# Patient Record
Sex: MALE | Race: WHITE | Employment: FULL TIME | URBAN - METROPOLITAN AREA
[De-identification: names, ages, dates, MRNs, and addresses within clinical notes are randomized per-mention and may not be internally consistent; named-entity substitution may affect disease eponyms.]

---

## 2021-02-28 PROCEDURE — 96375 TX/PRO/DX INJ NEW DRUG ADDON: CPT

## 2021-02-28 PROCEDURE — 99285 EMERGENCY DEPT VISIT HI MDM: CPT

## 2021-02-28 PROCEDURE — 96374 THER/PROPH/DIAG INJ IV PUSH: CPT

## 2021-03-01 ENCOUNTER — ANESTHESIA (OUTPATIENT)
Dept: OPERATING ROOM | Age: 61
DRG: 419 | End: 2021-03-01
Payer: COMMERCIAL

## 2021-03-01 ENCOUNTER — HOSPITAL ENCOUNTER (INPATIENT)
Age: 61
LOS: 1 days | Discharge: HOME | DRG: 419 | End: 2021-03-01
Attending: EMERGENCY MEDICINE | Admitting: PHYSICIAN ASSISTANT
Payer: COMMERCIAL

## 2021-03-01 ENCOUNTER — APPOINTMENT (OUTPATIENT)
Dept: CT IMAGING | Age: 61
DRG: 419 | End: 2021-03-01
Payer: COMMERCIAL

## 2021-03-01 ENCOUNTER — ANESTHESIA EVENT (OUTPATIENT)
Dept: OPERATING ROOM | Age: 61
DRG: 419 | End: 2021-03-01
Payer: COMMERCIAL

## 2021-03-01 VITALS
HEART RATE: 68 BPM | RESPIRATION RATE: 16 BRPM | HEIGHT: 68 IN | BODY MASS INDEX: 25.14 KG/M2 | OXYGEN SATURATION: 95 % | WEIGHT: 165.9 LBS | SYSTOLIC BLOOD PRESSURE: 118 MMHG | DIASTOLIC BLOOD PRESSURE: 72 MMHG | TEMPERATURE: 98.2 F

## 2021-03-01 VITALS — SYSTOLIC BLOOD PRESSURE: 104 MMHG | OXYGEN SATURATION: 100 % | DIASTOLIC BLOOD PRESSURE: 58 MMHG

## 2021-03-01 DIAGNOSIS — R52 INTRACTABLE PAIN: ICD-10-CM

## 2021-03-01 DIAGNOSIS — K80.20 SYMPTOMATIC CHOLELITHIASIS: Primary | ICD-10-CM

## 2021-03-01 PROBLEM — K81.0 ACUTE CHOLECYSTITIS: Status: ACTIVE | Noted: 2021-03-01

## 2021-03-01 LAB
ALBUMIN SERPL-MCNC: 4.5 G/DL (ref 3.4–5)
ALP BLD-CCNC: 87 U/L (ref 40–129)
ALT SERPL-CCNC: 16 U/L (ref 10–40)
ANION GAP SERPL CALCULATED.3IONS-SCNC: 11 MMOL/L (ref 3–16)
AST SERPL-CCNC: 20 U/L (ref 15–37)
BASOPHILS ABSOLUTE: 0.1 K/UL (ref 0–0.2)
BASOPHILS RELATIVE PERCENT: 0.9 %
BILIRUB SERPL-MCNC: <0.2 MG/DL (ref 0–1)
BILIRUBIN DIRECT: <0.2 MG/DL (ref 0–0.3)
BILIRUBIN URINE: NEGATIVE
BILIRUBIN, INDIRECT: NORMAL MG/DL (ref 0–1)
BLOOD, URINE: ABNORMAL
BUN BLDV-MCNC: 12 MG/DL (ref 7–20)
CALCIUM SERPL-MCNC: 9.6 MG/DL (ref 8.3–10.6)
CHLORIDE BLD-SCNC: 106 MMOL/L (ref 99–110)
CLARITY: CLEAR
CO2: 23 MMOL/L (ref 21–32)
COLOR: YELLOW
CREAT SERPL-MCNC: 0.7 MG/DL (ref 0.8–1.3)
EOSINOPHILS ABSOLUTE: 0.1 K/UL (ref 0–0.6)
EOSINOPHILS RELATIVE PERCENT: 1 %
EPITHELIAL CELLS, UA: ABNORMAL /HPF (ref 0–5)
GFR AFRICAN AMERICAN: >60
GFR NON-AFRICAN AMERICAN: >60
GLUCOSE BLD-MCNC: 124 MG/DL (ref 70–99)
GLUCOSE URINE: NEGATIVE MG/DL
HCT VFR BLD CALC: 43.8 % (ref 40.5–52.5)
HEMOGLOBIN: 14.6 G/DL (ref 13.5–17.5)
KETONES, URINE: NEGATIVE MG/DL
LACTIC ACID, SEPSIS: 1.2 MMOL/L (ref 0.4–1.9)
LEUKOCYTE ESTERASE, URINE: NEGATIVE
LIPASE: 30 U/L (ref 13–60)
LYMPHOCYTES ABSOLUTE: 1.4 K/UL (ref 1–5.1)
LYMPHOCYTES RELATIVE PERCENT: 10.4 %
MCH RBC QN AUTO: 30.7 PG (ref 26–34)
MCHC RBC AUTO-ENTMCNC: 33.4 G/DL (ref 31–36)
MCV RBC AUTO: 91.8 FL (ref 80–100)
MICROSCOPIC EXAMINATION: YES
MONOCYTES ABSOLUTE: 0.6 K/UL (ref 0–1.3)
MONOCYTES RELATIVE PERCENT: 4.2 %
MUCUS: ABNORMAL /LPF
NEUTROPHILS ABSOLUTE: 11.6 K/UL (ref 1.7–7.7)
NEUTROPHILS RELATIVE PERCENT: 83.5 %
NITRITE, URINE: NEGATIVE
PDW BLD-RTO: 14 % (ref 12.4–15.4)
PH UA: 6 (ref 5–8)
PLATELET # BLD: 244 K/UL (ref 135–450)
PMV BLD AUTO: 7.6 FL (ref 5–10.5)
POTASSIUM REFLEX MAGNESIUM: 4.3 MMOL/L (ref 3.5–5.1)
PROCALCITONIN: 0.03 NG/ML (ref 0–0.15)
PROTEIN UA: NEGATIVE MG/DL
RBC # BLD: 4.77 M/UL (ref 4.2–5.9)
RBC UA: ABNORMAL /HPF (ref 0–4)
RENAL EPITHELIAL, UA: ABNORMAL /HPF (ref 0–1)
SARS-COV-2, NAAT: NOT DETECTED
SODIUM BLD-SCNC: 140 MMOL/L (ref 136–145)
SPECIFIC GRAVITY UA: >=1.03 (ref 1–1.03)
TOTAL PROTEIN: 7.4 G/DL (ref 6.4–8.2)
URINE TYPE: ABNORMAL
UROBILINOGEN, URINE: 0.2 E.U./DL
WBC # BLD: 13.9 K/UL (ref 4–11)
WBC UA: ABNORMAL /HPF (ref 0–5)

## 2021-03-01 PROCEDURE — 84145 PROCALCITONIN (PCT): CPT

## 2021-03-01 PROCEDURE — 99221 1ST HOSP IP/OBS SF/LOW 40: CPT | Performed by: SURGERY

## 2021-03-01 PROCEDURE — 2580000003 HC RX 258: Performed by: NURSE ANESTHETIST, CERTIFIED REGISTERED

## 2021-03-01 PROCEDURE — 1200000000 HC SEMI PRIVATE

## 2021-03-01 PROCEDURE — 0FT44ZZ RESECTION OF GALLBLADDER, PERCUTANEOUS ENDOSCOPIC APPROACH: ICD-10-PCS | Performed by: SURGERY

## 2021-03-01 PROCEDURE — 2720000010 HC SURG SUPPLY STERILE: Performed by: SURGERY

## 2021-03-01 PROCEDURE — 85025 COMPLETE CBC W/AUTO DIFF WBC: CPT

## 2021-03-01 PROCEDURE — 6360000002 HC RX W HCPCS: Performed by: EMERGENCY MEDICINE

## 2021-03-01 PROCEDURE — 80076 HEPATIC FUNCTION PANEL: CPT

## 2021-03-01 PROCEDURE — 36415 COLL VENOUS BLD VENIPUNCTURE: CPT

## 2021-03-01 PROCEDURE — 80048 BASIC METABOLIC PNL TOTAL CA: CPT

## 2021-03-01 PROCEDURE — 6360000002 HC RX W HCPCS: Performed by: HOSPITALIST

## 2021-03-01 PROCEDURE — 83690 ASSAY OF LIPASE: CPT

## 2021-03-01 PROCEDURE — 2580000003 HC RX 258: Performed by: EMERGENCY MEDICINE

## 2021-03-01 PROCEDURE — 88304 TISSUE EXAM BY PATHOLOGIST: CPT

## 2021-03-01 PROCEDURE — 3600000014 HC SURGERY LEVEL 4 ADDTL 15MIN: Performed by: SURGERY

## 2021-03-01 PROCEDURE — 2580000003 HC RX 258: Performed by: SURGERY

## 2021-03-01 PROCEDURE — 83605 ASSAY OF LACTIC ACID: CPT

## 2021-03-01 PROCEDURE — 6360000004 HC RX CONTRAST MEDICATION: Performed by: EMERGENCY MEDICINE

## 2021-03-01 PROCEDURE — 3700000001 HC ADD 15 MINUTES (ANESTHESIA): Performed by: SURGERY

## 2021-03-01 PROCEDURE — 3600000004 HC SURGERY LEVEL 4 BASE: Performed by: SURGERY

## 2021-03-01 PROCEDURE — 6360000002 HC RX W HCPCS: Performed by: NURSE ANESTHETIST, CERTIFIED REGISTERED

## 2021-03-01 PROCEDURE — 7100000000 HC PACU RECOVERY - FIRST 15 MIN: Performed by: SURGERY

## 2021-03-01 PROCEDURE — 74177 CT ABD & PELVIS W/CONTRAST: CPT

## 2021-03-01 PROCEDURE — 2500000003 HC RX 250 WO HCPCS: Performed by: HOSPITALIST

## 2021-03-01 PROCEDURE — 99222 1ST HOSP IP/OBS MODERATE 55: CPT | Performed by: PHYSICIAN ASSISTANT

## 2021-03-01 PROCEDURE — 2709999900 HC NON-CHARGEABLE SUPPLY: Performed by: SURGERY

## 2021-03-01 PROCEDURE — 2500000003 HC RX 250 WO HCPCS: Performed by: SURGERY

## 2021-03-01 PROCEDURE — 87635 SARS-COV-2 COVID-19 AMP PRB: CPT

## 2021-03-01 PROCEDURE — 47562 LAPAROSCOPIC CHOLECYSTECTOMY: CPT | Performed by: SURGERY

## 2021-03-01 PROCEDURE — 3700000000 HC ANESTHESIA ATTENDED CARE: Performed by: SURGERY

## 2021-03-01 PROCEDURE — 2580000003 HC RX 258: Performed by: HOSPITALIST

## 2021-03-01 PROCEDURE — 2500000003 HC RX 250 WO HCPCS: Performed by: NURSE ANESTHETIST, CERTIFIED REGISTERED

## 2021-03-01 PROCEDURE — 81001 URINALYSIS AUTO W/SCOPE: CPT

## 2021-03-01 PROCEDURE — 7100000001 HC PACU RECOVERY - ADDTL 15 MIN: Performed by: SURGERY

## 2021-03-01 RX ORDER — OXYCODONE HYDROCHLORIDE AND ACETAMINOPHEN 5; 325 MG/1; MG/1
2 TABLET ORAL PRN
Status: DISCONTINUED | OUTPATIENT
Start: 2021-03-01 | End: 2021-03-01

## 2021-03-01 RX ORDER — ACETAMINOPHEN 650 MG/1
650 SUPPOSITORY RECTAL EVERY 6 HOURS PRN
Status: DISCONTINUED | OUTPATIENT
Start: 2021-03-01 | End: 2021-03-01 | Stop reason: HOSPADM

## 2021-03-01 RX ORDER — SODIUM CHLORIDE 0.9 % (FLUSH) 0.9 %
10 SYRINGE (ML) INJECTION EVERY 12 HOURS SCHEDULED
Status: DISCONTINUED | OUTPATIENT
Start: 2021-03-01 | End: 2021-03-01 | Stop reason: HOSPADM

## 2021-03-01 RX ORDER — SODIUM CHLORIDE 9 MG/ML
INJECTION, SOLUTION INTRAVENOUS CONTINUOUS
Status: DISCONTINUED | OUTPATIENT
Start: 2021-03-01 | End: 2021-03-01 | Stop reason: HOSPADM

## 2021-03-01 RX ORDER — KETOROLAC TROMETHAMINE 30 MG/ML
15 INJECTION, SOLUTION INTRAMUSCULAR; INTRAVENOUS ONCE
Status: COMPLETED | OUTPATIENT
Start: 2021-03-01 | End: 2021-03-01

## 2021-03-01 RX ORDER — CIPROFLOXACIN 2 MG/ML
400 INJECTION, SOLUTION INTRAVENOUS EVERY 12 HOURS
Status: DISCONTINUED | OUTPATIENT
Start: 2021-03-01 | End: 2021-03-01 | Stop reason: HOSPADM

## 2021-03-01 RX ORDER — HYDRALAZINE HYDROCHLORIDE 20 MG/ML
5 INJECTION INTRAMUSCULAR; INTRAVENOUS EVERY 10 MIN PRN
Status: DISCONTINUED | OUTPATIENT
Start: 2021-03-01 | End: 2021-03-01

## 2021-03-01 RX ORDER — OXYCODONE HYDROCHLORIDE 5 MG/1
5 TABLET ORAL EVERY 4 HOURS PRN
Status: DISCONTINUED | OUTPATIENT
Start: 2021-03-01 | End: 2021-03-01 | Stop reason: HOSPADM

## 2021-03-01 RX ORDER — SODIUM CHLORIDE, SODIUM LACTATE, POTASSIUM CHLORIDE, CALCIUM CHLORIDE 600; 310; 30; 20 MG/100ML; MG/100ML; MG/100ML; MG/100ML
INJECTION, SOLUTION INTRAVENOUS CONTINUOUS PRN
Status: DISCONTINUED | OUTPATIENT
Start: 2021-03-01 | End: 2021-03-01 | Stop reason: SDUPTHER

## 2021-03-01 RX ORDER — OXYCODONE HYDROCHLORIDE 5 MG/1
10 TABLET ORAL EVERY 4 HOURS PRN
Status: DISCONTINUED | OUTPATIENT
Start: 2021-03-01 | End: 2021-03-01 | Stop reason: HOSPADM

## 2021-03-01 RX ORDER — ONDANSETRON 2 MG/ML
INJECTION INTRAMUSCULAR; INTRAVENOUS PRN
Status: DISCONTINUED | OUTPATIENT
Start: 2021-03-01 | End: 2021-03-01 | Stop reason: SDUPTHER

## 2021-03-01 RX ORDER — LIDOCAINE HYDROCHLORIDE 20 MG/ML
INJECTION, SOLUTION INFILTRATION; PERINEURAL PRN
Status: DISCONTINUED | OUTPATIENT
Start: 2021-03-01 | End: 2021-03-01 | Stop reason: SDUPTHER

## 2021-03-01 RX ORDER — MEPERIDINE HYDROCHLORIDE 25 MG/ML
12.5 INJECTION INTRAMUSCULAR; INTRAVENOUS; SUBCUTANEOUS EVERY 5 MIN PRN
Status: DISCONTINUED | OUTPATIENT
Start: 2021-03-01 | End: 2021-03-01

## 2021-03-01 RX ORDER — ONDANSETRON 2 MG/ML
4 INJECTION INTRAMUSCULAR; INTRAVENOUS EVERY 6 HOURS PRN
Status: DISCONTINUED | OUTPATIENT
Start: 2021-03-01 | End: 2021-03-01 | Stop reason: HOSPADM

## 2021-03-01 RX ORDER — PROMETHAZINE HYDROCHLORIDE 25 MG/1
12.5 TABLET ORAL EVERY 6 HOURS PRN
Status: DISCONTINUED | OUTPATIENT
Start: 2021-03-01 | End: 2021-03-01 | Stop reason: HOSPADM

## 2021-03-01 RX ORDER — LORATADINE 10 MG/1
10 TABLET ORAL DAILY
COMMUNITY

## 2021-03-01 RX ORDER — OXYCODONE HYDROCHLORIDE 5 MG/1
5 TABLET ORAL EVERY 4 HOURS PRN
Qty: 20 TABLET | Refills: 0 | Status: SHIPPED | OUTPATIENT
Start: 2021-03-01 | End: 2021-03-06

## 2021-03-01 RX ORDER — ROCURONIUM BROMIDE 10 MG/ML
INJECTION, SOLUTION INTRAVENOUS PRN
Status: DISCONTINUED | OUTPATIENT
Start: 2021-03-01 | End: 2021-03-01 | Stop reason: SDUPTHER

## 2021-03-01 RX ORDER — OXYCODONE HYDROCHLORIDE AND ACETAMINOPHEN 5; 325 MG/1; MG/1
1 TABLET ORAL PRN
Status: DISCONTINUED | OUTPATIENT
Start: 2021-03-01 | End: 2021-03-01

## 2021-03-01 RX ORDER — BUPIVACAINE HYDROCHLORIDE 5 MG/ML
INJECTION, SOLUTION EPIDURAL; INTRACAUDAL PRN
Status: DISCONTINUED | OUTPATIENT
Start: 2021-03-01 | End: 2021-03-01 | Stop reason: ALTCHOICE

## 2021-03-01 RX ORDER — DEXAMETHASONE SODIUM PHOSPHATE 4 MG/ML
INJECTION, SOLUTION INTRA-ARTICULAR; INTRALESIONAL; INTRAMUSCULAR; INTRAVENOUS; SOFT TISSUE PRN
Status: DISCONTINUED | OUTPATIENT
Start: 2021-03-01 | End: 2021-03-01 | Stop reason: SDUPTHER

## 2021-03-01 RX ORDER — FENTANYL CITRATE 50 UG/ML
INJECTION, SOLUTION INTRAMUSCULAR; INTRAVENOUS PRN
Status: DISCONTINUED | OUTPATIENT
Start: 2021-03-01 | End: 2021-03-01 | Stop reason: SDUPTHER

## 2021-03-01 RX ORDER — SODIUM CHLORIDE, SODIUM LACTATE, POTASSIUM CHLORIDE, AND CALCIUM CHLORIDE .6; .31; .03; .02 G/100ML; G/100ML; G/100ML; G/100ML
IRRIGANT IRRIGATION PRN
Status: DISCONTINUED | OUTPATIENT
Start: 2021-03-01 | End: 2021-03-01 | Stop reason: ALTCHOICE

## 2021-03-01 RX ORDER — ACETAMINOPHEN 325 MG/1
650 TABLET ORAL EVERY 6 HOURS PRN
Status: DISCONTINUED | OUTPATIENT
Start: 2021-03-01 | End: 2021-03-01 | Stop reason: HOSPADM

## 2021-03-01 RX ORDER — LABETALOL HYDROCHLORIDE 5 MG/ML
5 INJECTION, SOLUTION INTRAVENOUS EVERY 10 MIN PRN
Status: DISCONTINUED | OUTPATIENT
Start: 2021-03-01 | End: 2021-03-01

## 2021-03-01 RX ORDER — 0.9 % SODIUM CHLORIDE 0.9 %
1000 INTRAVENOUS SOLUTION INTRAVENOUS ONCE
Status: COMPLETED | OUTPATIENT
Start: 2021-03-01 | End: 2021-03-01

## 2021-03-01 RX ORDER — KETOROLAC TROMETHAMINE 30 MG/ML
INJECTION, SOLUTION INTRAMUSCULAR; INTRAVENOUS PRN
Status: DISCONTINUED | OUTPATIENT
Start: 2021-03-01 | End: 2021-03-01 | Stop reason: SDUPTHER

## 2021-03-01 RX ORDER — ONDANSETRON 2 MG/ML
4 INJECTION INTRAMUSCULAR; INTRAVENOUS EVERY 10 MIN PRN
Status: DISCONTINUED | OUTPATIENT
Start: 2021-03-01 | End: 2021-03-01

## 2021-03-01 RX ORDER — SODIUM CHLORIDE 0.9 % (FLUSH) 0.9 %
10 SYRINGE (ML) INJECTION PRN
Status: DISCONTINUED | OUTPATIENT
Start: 2021-03-01 | End: 2021-03-01 | Stop reason: HOSPADM

## 2021-03-01 RX ORDER — MORPHINE SULFATE 2 MG/ML
2 INJECTION, SOLUTION INTRAMUSCULAR; INTRAVENOUS EVERY 4 HOURS PRN
Status: DISCONTINUED | OUTPATIENT
Start: 2021-03-01 | End: 2021-03-01 | Stop reason: HOSPADM

## 2021-03-01 RX ORDER — POLYETHYLENE GLYCOL 3350 17 G/17G
17 POWDER, FOR SOLUTION ORAL DAILY PRN
Status: DISCONTINUED | OUTPATIENT
Start: 2021-03-01 | End: 2021-03-01 | Stop reason: HOSPADM

## 2021-03-01 RX ORDER — PROPOFOL 10 MG/ML
INJECTION, EMULSION INTRAVENOUS PRN
Status: DISCONTINUED | OUTPATIENT
Start: 2021-03-01 | End: 2021-03-01 | Stop reason: SDUPTHER

## 2021-03-01 RX ADMIN — METRONIDAZOLE 500 MG: 500 INJECTION, SOLUTION INTRAVENOUS at 14:20

## 2021-03-01 RX ADMIN — SODIUM CHLORIDE 1000 ML: 9 INJECTION, SOLUTION INTRAVENOUS at 01:49

## 2021-03-01 RX ADMIN — SUGAMMADEX 200 MG: 100 INJECTION, SOLUTION INTRAVENOUS at 11:52

## 2021-03-01 RX ADMIN — KETOROLAC TROMETHAMINE 15 MG: 30 INJECTION, SOLUTION INTRAMUSCULAR; INTRAVENOUS at 03:00

## 2021-03-01 RX ADMIN — FENTANYL CITRATE 50 MCG: 50 INJECTION INTRAMUSCULAR; INTRAVENOUS at 11:26

## 2021-03-01 RX ADMIN — METRONIDAZOLE 500 MG: 500 INJECTION, SOLUTION INTRAVENOUS at 05:41

## 2021-03-01 RX ADMIN — ROCURONIUM BROMIDE 50 MG: 10 INJECTION, SOLUTION INTRAVENOUS at 11:10

## 2021-03-01 RX ADMIN — IOPAMIDOL 75 ML: 755 INJECTION, SOLUTION INTRAVENOUS at 01:59

## 2021-03-01 RX ADMIN — KETOROLAC TROMETHAMINE 30 MG: 30 INJECTION, SOLUTION INTRAMUSCULAR at 11:52

## 2021-03-01 RX ADMIN — SODIUM CHLORIDE, POTASSIUM CHLORIDE, SODIUM LACTATE AND CALCIUM CHLORIDE: 600; 310; 30; 20 INJECTION, SOLUTION INTRAVENOUS at 11:06

## 2021-03-01 RX ADMIN — ENOXAPARIN SODIUM 40 MG: 40 INJECTION SUBCUTANEOUS at 09:39

## 2021-03-01 RX ADMIN — SODIUM CHLORIDE, POTASSIUM CHLORIDE, SODIUM LACTATE AND CALCIUM CHLORIDE: 600; 310; 30; 20 INJECTION, SOLUTION INTRAVENOUS at 11:46

## 2021-03-01 RX ADMIN — LIDOCAINE HYDROCHLORIDE 30 MG: 20 INJECTION, SOLUTION INFILTRATION; PERINEURAL at 11:10

## 2021-03-01 RX ADMIN — PROPOFOL 150 MG: 10 INJECTION, EMULSION INTRAVENOUS at 11:10

## 2021-03-01 RX ADMIN — ONDANSETRON 4 MG: 2 INJECTION, SOLUTION INTRAMUSCULAR; INTRAVENOUS at 11:17

## 2021-03-01 RX ADMIN — Medication 10 ML: at 05:41

## 2021-03-01 RX ADMIN — CIPROFLOXACIN 400 MG: 2 INJECTION, SOLUTION INTRAVENOUS at 05:41

## 2021-03-01 RX ADMIN — SODIUM CHLORIDE: 9 INJECTION, SOLUTION INTRAVENOUS at 05:40

## 2021-03-01 RX ADMIN — FENTANYL CITRATE 50 MCG: 50 INJECTION INTRAMUSCULAR; INTRAVENOUS at 11:10

## 2021-03-01 RX ADMIN — DEXAMETHASONE SODIUM PHOSPHATE 8 MG: 4 INJECTION, SOLUTION INTRAMUSCULAR; INTRAVENOUS at 11:17

## 2021-03-01 RX ADMIN — HYDROMORPHONE HYDROCHLORIDE 0.5 MG: 1 INJECTION, SOLUTION INTRAMUSCULAR; INTRAVENOUS; SUBCUTANEOUS at 01:49

## 2021-03-01 ASSESSMENT — PULMONARY FUNCTION TESTS
PIF_VALUE: 10
PIF_VALUE: 13
PIF_VALUE: 12
PIF_VALUE: 11
PIF_VALUE: 11
PIF_VALUE: 14
PIF_VALUE: 0
PIF_VALUE: 4
PIF_VALUE: 11
PIF_VALUE: 15
PIF_VALUE: 3
PIF_VALUE: 13
PIF_VALUE: 16
PIF_VALUE: 15
PIF_VALUE: 1
PIF_VALUE: 12
PIF_VALUE: 19
PIF_VALUE: 15
PIF_VALUE: 15
PIF_VALUE: 1
PIF_VALUE: 15
PIF_VALUE: 10
PIF_VALUE: 19
PIF_VALUE: 18
PIF_VALUE: 17
PIF_VALUE: 10
PIF_VALUE: 18
PIF_VALUE: 17
PIF_VALUE: 14
PIF_VALUE: 0
PIF_VALUE: 12
PIF_VALUE: 0
PIF_VALUE: 20
PIF_VALUE: 8
PIF_VALUE: 7
PIF_VALUE: 5
PIF_VALUE: 18
PIF_VALUE: 12

## 2021-03-01 ASSESSMENT — PAIN SCALES - GENERAL
PAINLEVEL_OUTOF10: 0
PAINLEVEL_OUTOF10: 1

## 2021-03-01 ASSESSMENT — PAIN DESCRIPTION - DESCRIPTORS: DESCRIPTORS: DULL;DISCOMFORT

## 2021-03-01 ASSESSMENT — PAIN DESCRIPTION - PROGRESSION: CLINICAL_PROGRESSION: NOT CHANGED

## 2021-03-01 ASSESSMENT — COPD QUESTIONNAIRES: CAT_SEVERITY: MILD

## 2021-03-01 ASSESSMENT — PAIN DESCRIPTION - FREQUENCY: FREQUENCY: INTERMITTENT

## 2021-03-01 ASSESSMENT — PAIN DESCRIPTION - PAIN TYPE: TYPE: ACUTE PAIN

## 2021-03-01 NOTE — PROGRESS NOTES
Pt is awake & alert. He remains w/out evidence or c/o discomfort. VSS, pt remains afebrile. He is resting w/out evidence of discomfort @ this time.

## 2021-03-01 NOTE — BRIEF OP NOTE
Brief Postoperative Note      Patient: Suly Spivey  YOB: 1960  MRN: [id number]    Date of Procedure: 3/1/2021    Pre-Op Diagnosis: SYMPTOMATIC CHOLELITHIASIS    Post-Op Diagnosis: Same       Procedure(s):  LAPAROSCOPIC CHOLECYSTECTOMY    Surgeon(s):  Ashley Hinson MD    Assistant:  Surgical Assistant: Eulalio Shore    Anesthesia: General    Estimated Blood Loss (mL): Minimal    Complications: None    Specimens:   ID Type Source Tests Collected by Time Destination   A : and contents   Tissue Gallbladder SURGICAL PATHOLOGY Ashley Hinson MD 3/1/2021 1140        Implants:  * No implants in log *      Drains: * No LDAs found *    Findings: As above    Electronically signed by Ranjith Hannah MD on 3/1/2021 at 12:00 PM

## 2021-03-01 NOTE — ANESTHESIA POSTPROCEDURE EVALUATION
Department of Anesthesiology  Postprocedure Note    Patient: Chiquis Dsouza  MRN: [id number]  YOB: 1960  Date of evaluation: 3/1/2021  Time:  12:59 PM     Procedure Summary     Date: 03/01/21 Room / Location: [facility]    Anesthesia Start: 1106 Anesthesia Stop: 0543    Procedure: LAPAROSCOPIC CHOLECYSTECTOMY (N/A Abdomen) Diagnosis: (ABDOMINAL PAIN)    Surgeons: Tom Plunkett MD Responsible Provider: Juliana Cordero MD    Anesthesia Type: general ASA Status: 2          Anesthesia Type: general    Yadira Phase I: Yadira Score: 9    Yadira Phase II:      Last vitals: Reviewed and per EMR flowsheets.        Anesthesia Post Evaluation    Patient location during evaluation: PACU  Level of consciousness: awake  Airway patency: patent  Nausea & Vomiting: no nausea  Complications: no  Cardiovascular status: blood pressure returned to baseline  Respiratory status: acceptable  Hydration status: euvolemic

## 2021-03-01 NOTE — PROGRESS NOTES
Pt has been up & amb. To the BR X2. He is voiding w/out difficulty. Pt is taking PO well & remains w/out c/o N/V. Surg. Here to assess pt. Abd. dsg remain D/I & no evidence of drainage.

## 2021-03-01 NOTE — CONSULTS
[facility]    HPI:  Patient is 64y.o. year old male seen at request of Hospitalist service. He reports pain in right middle abdomen. It is pressure-like and sharp. It is described as severe. He could not stand up straight at home. Other associated symptoms are bloating/abdominal distension and nausea. These symptoms have been present for 1 days . They are  made worse by eating. The pain does not radiate to the back. No alleviating or modifying factors. Past Medical History:   Diagnosis Date    Emphysema lung ([facility] [address]        History reviewed. No pertinent surgical history. No current facility-administered medications on file prior to encounter.       Current Outpatient Medications on File Prior to Encounter   Medication Sig Dispense Refill    loratadine (CLARITIN) 10 MG tablet Take 10 mg by mouth daily         Allergies   Allergen Reactions    Codeine Nausea Only       Social History     Socioeconomic History    Marital status:      Spouse name: Not on file    Number of children: Not on file    Years of education: Not on file    Highest education level: Not on file   Occupational History    Not on file   Social Needs    Financial resource strain: Not on file    Food insecurity     Worry: Not on file     Inability: Not on file    Transportation needs     Medical: Not on file     Non-medical: Not on file   Tobacco Use    Smoking status: Current Every Day Smoker     Packs/day: 0.50    Smokeless tobacco: Never Used   Substance and Sexual Activity    Alcohol use: Not Currently    Drug use: Not on file    Sexual activity: Not on file   Lifestyle    Physical activity     Days per week: Not on file     Minutes per session: Not on file    Stress: Not on file   Relationships    Social connections     Talks on phone: Not on file     Gets together: Not on file     Attends [employer] service: Not on file     Active member of club or organization: Not on file     Attends

## 2021-03-01 NOTE — CONSULTS
3/1/2021  Grace Faustin    Reason for Consult:  Hematuria, bladder lesion  Requesting Physician:  Alvina Sumner      History Obtained From:  patient, electronic medical record    HISTORY OF PRESENT ILLNESS:                The patient is a 64 y.o. male who presents with an acute gall bladder. He had surgery this am.  His CT showed normal upper tracts but a thickened bladder wall. U/A is positive for blood and he is a heavy smoker. Past Medical History:        Diagnosis Date    Emphysema lung [facility])      Past Surgical History:        Procedure Laterality Date    CHOLECYSTECTOMY, LAPAROSCOPIC N/A 03/01/2021    CHOLECYSTECTOMY, LAPAROSCOPIC N/A 3/1/2021    LAPAROSCOPIC CHOLECYSTECTOMY performed by Ulysses Appl, MD at [facility] OR     Current Medications:   Current Facility-Administered Medications: sodium chloride flush 0.9 % injection 10 mL, 10 mL, Intravenous, 2 times per day  sodium chloride flush 0.9 % injection 10 mL, 10 mL, Intravenous, PRN  enoxaparin (LOVENOX) injection 40 mg, 40 mg, Subcutaneous, Daily  promethazine (PHENERGAN) tablet 12.5 mg, 12.5 mg, Oral, Q6H PRN **OR** ondansetron (ZOFRAN) injection 4 mg, 4 mg, Intravenous, Q6H PRN  polyethylene glycol (GLYCOLAX) packet 17 g, 17 g, Oral, Daily PRN  acetaminophen (TYLENOL) tablet 650 mg, 650 mg, Oral, Q6H PRN **OR** acetaminophen (TYLENOL) suppository 650 mg, 650 mg, Rectal, Q6H PRN  0.9 % sodium chloride infusion, , Intravenous, Continuous  ciprofloxacin (CIPRO) IVPB 400 mg, 400 mg, Intravenous, Q12H  metronidazole (FLAGYL) 500 mg in NaCl 100 mL IVPB premix, 500 mg, Intravenous, Q8H  morphine (PF) injection 2 mg, 2 mg, Intravenous, Q4H PRN  oxyCODONE (ROXICODONE) immediate release tablet 5 mg, 5 mg, Oral, Q4H PRN **OR** oxyCODONE (ROXICODONE) immediate release tablet 10 mg, 10 mg, Oral, Q4H PRN  Allergies:     Allergies   Allergen Reactions    Codeine Nausea Only     Social History:  Reviewed, non contributory    Family History:  Reviewed, non contributory      REVIEW OF SYSTEMS:    12 point ROS has been completed and reviewed. He has significant obstructive symptoms. PHYSICAL EXAM:    VITALS:  /72   Pulse 68   Temp 98.2 °F (36.8 °C) (Oral)   Resp 16   Ht 5' 8\" (1.727 m)   Wt 165 lb 14.4 oz (75.3 kg)   SpO2 95%   BMI 25.23 kg/m²   H&N: Sclera normal, no masses, trachea midline, no bruit  CVS: Normal rate and rhythm, no murmurs or rubs, peripheral pulses equal, no clubbing or cyanosis. RESP: Breath sounds equal bilateral, few rhonchi. ABDO: Soft, non-tender, bowel sounds active, no organomegaly, no hernias. LYMPH:  No lymphadenopathy. Skin: Warm dry and intact. : No CVAT, normal external genitalia, no discharge, no DOMINIQUE was done at this time. MSK: Grossly normal for patient  HELENA: Grossly normal for patient  PSY: No acute changes noted in psychosocial assessment.     DATA:    CBC:   Lab Results   Component Value Date    WBC 13.9 03/01/2021    RBC 4.77 03/01/2021    HGB 14.6 03/01/2021    HCT 43.8 03/01/2021    MCV 91.8 03/01/2021    MCH 30.7 03/01/2021    MCHC 33.4 03/01/2021    RDW 14.0 03/01/2021     03/01/2021    MPV 7.6 03/01/2021     BMP:    Lab Results   Component Value Date     03/01/2021    K 4.3 03/01/2021     03/01/2021    CO2 23 03/01/2021    BUN 12 03/01/2021    LABALBU 4.5 03/01/2021    CREATININE 0.7 03/01/2021    CALCIUM 9.6 03/01/2021    GFRAA >60 03/01/2021    LABGLOM >60 03/01/2021    GLUCOSE 124 03/01/2021     U/A:    Lab Results   Component Value Date    COLORU Yellow 03/01/2021    PROTEINU Negative 03/01/2021    PHUR 6.0 03/01/2021    WBCUA 3-5 03/01/2021    RBCUA 3-4 03/01/2021    MUCUS 2+ 03/01/2021    CLARITYU Clear 03/01/2021    SPECGRAV >=1.030 03/01/2021    LEUKOCYTESUR Negative 03/01/2021    UROBILINOGEN 0.2 03/01/2021    BILIRUBINUR Negative 03/01/2021    BLOODU SMALL 03/01/2021    GLUCOSEU Negative 03/01/2021     CT reviewed - see report    IMPRESSION/RECOMMENDATIONS:      Microhematuria. Thickened bladder wall. BPH with secondary obstructive symptoms. S/P cholecystectomy. PLAN:  Patient will likely be discharged home today. I have asked him to f/u in the office in 2-3 weeks. He will need to be set up for an outpatient cystoscopy to complete his work up and assessment for bladder pathology and outlet obstruction. The importance of the cystoscopy was stressed to the patient and his wife. Thank you for asking me to see this interesting patient.     BOO Raza

## 2021-03-01 NOTE — H&P
Hospital Medicine History & Physical      PCP: No primary care provider on file. Date of Admission: 3/1/2021    Date of Service: Pt seen/examined on 3/1/2021     Chief Complaint:    Chief Complaint   Patient presents with    Abdominal Pain     Started earlier today - right mid abd. States healthy no hx of abd problems. Abd slightly tender soft. History Of Present Illness: The patient is a 64 y.o. male with a PMH of COPD who presented to [facility] ED with complaint of right lower abdominal pain that started after he ate around noon yesterday. Pt reports he had eaten scrambled eggs and orange rolls. Pt denies ever having had pain like this before. Reported having dry heaves but no vomiting, diarrhea. His last BM was ~ 1 pm yesterday. Reported having \"the shivers\" a couple times yesterday but no documented fevers. Pt reports that his abdominal pain lasted for several hours. It improved with pain medications. He states today his pain is resolved. He does report intermittent cigarette use. Past Medical History:        Diagnosis Date    Emphysema lung [facility])        Past Surgical History:    History reviewed. No pertinent surgical history. Medications Prior to Admission:    Prior to Admission medications    Medication Sig Start Date End Date Taking? Authorizing Provider   loratadine (CLARITIN) 10 MG tablet Take 10 mg by mouth daily   Yes Historical Provider, MD       Allergies:  Codeine    Social History:  The patient currently lives at home. TOBACCO:   reports that he has been smoking. He has been smoking about 0.50 packs per day. He has never used smokeless tobacco.  ETOH:   reports previous alcohol use. Family History:   Positive as follows:    History reviewed. No pertinent family history.     REVIEW OF SYSTEMS:     Constitutional: Negative for fever   HENT: Negative for sore throat   Eyes: Negative for redness   Respiratory: Negative  for dyspnea, cough   Cardiovascular: Negative for chest pain   Gastrointestinal: Negative for vomiting, diarrhea, + abdominal pain and dry heaves   Genitourinary: Negative for hematuria   Musculoskeletal: Negative for arthralgias   Skin: Negative for rash   Neurological: Negative for syncope   Hematological: Negative for adenopathy   Psychiatric/Behavorial: Negative for anxiety    PHYSICAL EXAM:    /60   Pulse 67   Temp 98.3 °F (36.8 °C) (Oral)   Resp 16   Ht 5' 8\" (1.727 m)   Wt 165 lb 14.4 oz (75.3 kg)   SpO2 97%   BMI 25.23 kg/m²   Gen: No distress. Alert. Elderly  male, resting comfortably   Eyes: No sclera icterus. No conjunctival injection. Neck: Trachea midline. Resp: No accessory muscle use. No crackles. + diffuse expiratory wheezes. No rhonchi. On RA  CV: Regular rate. Regular rhythm. No murmur. No rub. No edema. GI: Soft, Mild right sided abdominal tenderness with deep palpation. Non-distended. Normal bowel sounds. Skin: Warm and dry. No rash on exposed extremities. Neuro: Awake. Grossly non-focal, follows commands, moves all extremities, no dysarthria   Psych: Oriented x 3. No anxiety or agitation.      CBC:   Recent Labs     03/01/21  0120   WBC 13.9*   HGB 14.6   HCT 43.8   MCV 91.8        BMP:   Recent Labs     03/01/21 0120      K 4.3      CO2 23   BUN 12   CREATININE 0.7*     LIVER PROFILE:   Recent Labs     03/01/21 0120   AST 20   ALT 16   LIPASE 30.0   BILIDIR <0.2   BILITOT <0.2   ALKPHOS 87     UA:  Recent Labs     03/01/21 0120   COLORU Yellow   PHUR 6.0   WBCUA 3-5   RBCUA 3-4   MUCUS 2+*   CLARITYU Clear   SPECGRAV >=1.030   LEUKOCYTESUR Negative   UROBILINOGEN 0.2   BILIRUBINUR Negative   BLOODU SMALL*   GLUCOSEU Negative      U/A:    Lab Results   Component Value Date    COLORU Yellow 03/01/2021    WBCUA 3-5 03/01/2021    RBCUA 3-4 03/01/2021    MUCUS 2+ 03/01/2021    CLARITYU Clear 03/01/2021    SPECGRAV >=1.030 03/01/2021    LEUKOCYTESUR Negative 03/01/2021    BLOODU SMALL 03/01/2021 GLUCOSEU Negative 03/01/2021     CULTURES  None    RADIOLOGY    CT ABDOMEN PELVIS W IV CONTRAST Additional Contrast? None   Final Result   1. Circumferential bladder wall thickening with question of mild surrounding   inflammatory change. Findings could indicate cystitis. Recommend   correlation with urinalysis. 2. Cholelithiasis without evidence of acute cholecystitis.            ASSESSMENT/PLAN:    Abdominal Pain  Cholelithiasis  - CT abdomen: cholelithiasis without evidence of acute cholecytitis  - LFTs normal, pt afebrile  - Admitted to Med Surg  - check PCT: 0.03  - NPO, IVF, cover with Cipro and Flagyl D#1, PRN morphine and anti-emetics  - General Surgery consult    Leukocytosis  - 13.9  - 2/2 above vs reactive  - trend CBC    Abnormal CT abdomen  - showed circumferential bladder wall thickening with question of mild surrounding inflammatory change  - denies gross hematuria, urgency, frequency or dysuria; UA w/ small blood  - urology consult to establish care, will need OP f/u    COPD  - no AE  - w/ wheezing on exam but pt denies any dyspnea    Tobacco Dependence  - Recommended cessation    DVT Prophylaxis: Lovenox  Diet: Diet NPO Effective Now  Code Status: Full Code    Bry Maya PA-C 8:27 AM 3/1/2021

## 2021-03-01 NOTE — PROGRESS NOTES
Discharge instructions reviewed with patient. Peripheral IV removed per protocol without complication. Prescription for Oxycodone 5 mg provided to patient at this time. All questions/concerns addressed. Patient discharged home in stable condition with spouse.

## 2021-03-01 NOTE — ED PROVIDER NOTES
[facility]      CHIEF COMPLAINT  Abdominal Pain (Started earlier today - right mid abd. States healthy no hx of abd problems. Abd slightly tender soft.  )       HISTORY OF PRESENT ILLNESS  Sanket Castillo is a 64 y.o. male  who presents to the ED complaining of abdominal pain. The patient states that this started earlier today. He describes it in his right abdomen. He states that nothing makes the pain worse or better. He cannot get in a comfortable position. He describes chills earlier, but denies fever. He is nauseated and has decreased appetite but denies vomiting. He had a normal bowel movement earlier without hematochezia or melena. He has not really been able to eat all day. He denies dysuria or hematuria. Denies prior abdominal surgeries. Denies chest pain or shortness of breath. Denies any other medical problems except for COPD. He also describes a rash that he has had on his bilateral lower extremities and neck for the past few weeks, he has a primary care appointment today for the rash. No other complaints, modifying factors or associated symptoms. I have reviewed the following from the nursing documentation. Past Medical History:   Diagnosis Date    Emphysema lung ([facility] [address]      History reviewed. No pertinent surgical history. History reviewed. No pertinent family history.   Social History     Socioeconomic History    Marital status:      Spouse name: Not on file    Number of children: Not on file    Years of education: Not on file    Highest education level: Not on file   Occupational History    Not on file   Social Needs    Financial resource strain: Not on file    Food insecurity     Worry: Not on file     Inability: Not on file    Transportation needs     Medical: Not on file     Non-medical: Not on file   Tobacco Use    Smoking status: Current Every Day Smoker     Packs/day: 0.50    Smokeless tobacco: Never Used   Substance and Sexual Activity mL IVPB premix  500 mg Intravenous Lexus Sin MD   Stopped at 03/01/21 0437    morphine (PF) injection 2 mg  2 mg Intravenous Q4H PRN Chantelle Ma MD         Allergies   Allergen Reactions    Codeine Nausea Only       REVIEW OF SYSTEMS  10 systems reviewed, pertinent positives per HPI otherwise noted to be negative. PHYSICAL EXAM  /60   Pulse 67   Temp 98.3 °F (36.8 °C) (Oral)   Resp 16   Ht 5' 8\" (1.727 m)   Wt 165 lb 14.4 oz (75.3 kg)   SpO2 97%   BMI 25.23 kg/m²    Physical exam:  General appearance: awake and cooperative. Moderate pain distress. Ill appearing. Skin: Warm and dry. There is a mildly erythematous, macular rash to bilateral arms as well as posterior neck; no vesicular lesions or skin sloughing; no urticaria. HENT: Normocephalic. Atraumatic. Mucus membranes are dry  Neck: supple  Eyes: JATIN. EOM intact. Heart: RRR. No murmurs. Lungs: Respirations unlabored. CTAB. No wheezes, rales, or rhonchi. Good air exchange  Abdomen: Tenderness to palpation right upper quadrant Orellana sign positive; right-sided abdominal tenderness as well without McBurney's point tenderness; no left-sided tenderness to palpation. Bowel sounds normal.  Soft. Non distended. No peritoneal signs. Musculoskeletal: No extremity edema. Compartments soft. No deformity. No tenderness in the extremities. All extremities neurovascularly intact. Radial, Dp, and PT pulses +2/4 bilaterally  Neurological: Alert and oriented. No focal deficits. No aphasia or dysarthria. No gait ataxia. Psychiatric: Normal mood and affect. LABS  I have reviewed all labs for this visit.    Results for orders placed or performed during the hospital encounter of 03/01/21   CBC auto differential   Result Value Ref Range    WBC 13.9 (H) 4.0 - 11.0 K/uL    RBC 4.77 4.20 - 5.90 M/uL    Hemoglobin 14.6 13.5 - 17.5 g/dL    Hematocrit 43.8 40.5 - 52.5 %    MCV 91.8 80.0 - 100.0 fL    MCH 30.7 26.0 - 34.0 pg    MCHC 33.4 31.0 - 36.0 g/dL    RDW 14.0 12.4 - 15.4 %    Platelets 102 295 - 918 K/uL    MPV 7.6 5.0 - 10.5 fL    Neutrophils % 83.5 %    Lymphocytes % 10.4 %    Monocytes % 4.2 %    Eosinophils % 1.0 %    Basophils % 0.9 %    Neutrophils Absolute 11.6 (H) 1.7 - 7.7 K/uL    Lymphocytes Absolute 1.4 1.0 - 5.1 K/uL    Monocytes Absolute 0.6 0.0 - 1.3 K/uL    Eosinophils Absolute 0.1 0.0 - 0.6 K/uL    Basophils Absolute 0.1 0.0 - 0.2 K/uL   Basic Metabolic Panel w/ Reflex to MG   Result Value Ref Range    Sodium 140 136 - 145 mmol/L    Potassium reflex Magnesium 4.3 3.5 - 5.1 mmol/L    Chloride 106 99 - 110 mmol/L    CO2 23 21 - 32 mmol/L    Anion Gap 11 3 - 16    Glucose 124 (H) 70 - 99 mg/dL    BUN 12 7 - 20 mg/dL    CREATININE 0.7 (L) 0.8 - 1.3 mg/dL    GFR Non-African American >60 >60    GFR African American >60 >60    Calcium 9.6 8.3 - 10.6 mg/dL   Urinalysis, reflex to microscopic   Result Value Ref Range    Color, UA Yellow Straw/Yellow    Clarity, UA Clear Clear    Glucose, Ur Negative Negative mg/dL    Bilirubin Urine Negative Negative    Ketones, Urine Negative Negative mg/dL    Specific Gravity, UA >=1.030 1.005 - 1.030    Blood, Urine SMALL (A) Negative    pH, UA 6.0 5.0 - 8.0    Protein, UA Negative Negative mg/dL    Urobilinogen, Urine 0.2 <2.0 E.U./dL    Nitrite, Urine Negative Negative    Leukocyte Esterase, Urine Negative Negative    Microscopic Examination YES     Urine Type NotGiven    Lipase   Result Value Ref Range    Lipase 30.0 13.0 - 60.0 U/L   Microscopic Urinalysis   Result Value Ref Range    Mucus, UA 2+ (A) None Seen /LPF    WBC, UA 3-5 0 - 5 /HPF    RBC, UA 3-4 0 - 4 /HPF    Epithelial Cells, UA 2-5 0 - 5 /HPF    Renal Epithelial, UA 0-1 0 - 1 /HPF   Lactate, Sepsis   Result Value Ref Range    Lactic Acid, Sepsis 1.2 0.4 - 1.9 mmol/L   Hepatic Function Panel   Result Value Ref Range    Total Protein 7.4 6.4 - 8.2 g/dL    Albumin 4.5 3.4 - 5.0 g/dL    Alkaline Phosphatase 87 40 - 129 U/L    ALT 16 10 - 40 U/L    AST 20 15 - 37 U/L    Total Bilirubin <0.2 0.0 - 1.0 mg/dL    Bilirubin, Direct <0.2 0.0 - 0.3 mg/dL    Bilirubin, Indirect see below 0.0 - 1.0 mg/dL       ECG      RADIOLOGY  Ct Abdomen Pelvis W Iv Contrast Additional Contrast? None    Result Date: 3/1/2021  EXAMINATION: CT OF THE ABDOMEN AND PELVIS WITH CONTRAST 3/1/2021 1:58 am TECHNIQUE: CT of the abdomen and pelvis was performed with the administration of intravenous contrast. Multiplanar reformatted images are provided for review. Dose modulation, iterative reconstruction, and/or weight based adjustment of the mA/kV was utilized to reduce the radiation dose to as low as reasonably achievable. COMPARISON: None. HISTORY: ORDERING SYSTEM PROVIDED HISTORY: rlq pain TECHNOLOGIST PROVIDED HISTORY: Additional Contrast?->None Reason for exam:->rlq pain Decision Support Exception->Emergency Medical Condition (MA) Reason for Exam: abdo pain Acuity: Acute Type of Exam: Subsequent/Follow-up Additional signs and symptoms: abdo pain, n/v: RLQ FINDINGS: Lower Chest:  Visualized portion of the lower chest demonstrates no acute abnormality. There are bibasilar emphysematous changes. Organs: The liver, spleen, pancreas, kidneys and adrenal glands are without acute findings. There is cholelithiasis without evidence of acute cholecystitis. GI/Bowel: No mechanical bowel obstruction. Normal appendix. Pelvis: The urinary bladder is nondistended. There is circumferential bladder wall thickening with question of mild surrounding inflammatory change. Small bilateral fat containing inguinal hernias without associated inflammation. Peritoneum/Retroperitoneum: Mild calcified atherosclerotic plaque. No lymphadenopathy. Bones/Soft Tissues: No acute or aggressive osseous lesions. 1. Circumferential bladder wall thickening with question of mild surrounding inflammatory change. Findings could indicate cystitis. Recommend correlation with urinalysis.  2. Cholelithiasis without evidence of acute cholecystitis. ED COURSE/MDM  Patient seen and evaluated. Old records reviewed. Labs and imaging reviewed and results discussed with patient. The patient is a 57-year-old male presenting with abdominal pain. Vital signs are within normal limits. He is afebrile. On exam, he does appear to be in moderate pain distress. His abdomen is nonperitoneal, but he does have tenderness in the right side most notably the right upper quadrant. Labs are ordered and reviewed. Is of leukocytosis of 13.9. Lactate is normal.  He does not have an IY, transaminitis, or elevated bilirubin. He does not have a significant electrolyte abnormality. UA is clear without infection. CT abdomen pelvis performed, does show cholelithiasis without evidence of cholecystitis. Patient is treated here, however he stills appears to be uncomfortable and in pain. I feel he will require admission for intractable pain due to symptomatic cholelithiasis at this time. I will hold off on antibiotics at this time. Patient agrees to be admitted for further treatment.     During the patient's ED course, the patient was given:  Medications   sodium chloride flush 0.9 % injection 10 mL (has no administration in time range)   sodium chloride flush 0.9 % injection 10 mL (10 mLs Intravenous Given 3/1/21 1890)   enoxaparin (LOVENOX) injection 40 mg (has no administration in time range)   promethazine (PHENERGAN) tablet 12.5 mg (has no administration in time range)     Or   ondansetron (ZOFRAN) injection 4 mg (has no administration in time range)   polyethylene glycol (GLYCOLAX) packet 17 g (has no administration in time range)   acetaminophen (TYLENOL) tablet 650 mg (has no administration in time range)     Or   acetaminophen (TYLENOL) suppository 650 mg (has no administration in time range)   0.9 % sodium chloride infusion ( Intravenous New Bag 3/1/21 3725)   ciprofloxacin (CIPRO) IVPB 400 mg (0 mg Intravenous Stopped 3/1/21 0644)   metronidazole (FLAGYL) 500 mg in NaCl 100 mL IVPB premix (0 mg Intravenous Stopped 3/1/21 0644)   morphine (PF) injection 2 mg (has no administration in time range)   HYDROmorphone (DILAUDID) injection 0.5 mg (0.5 mg Intravenous Given 3/1/21 0149)   0.9 % sodium chloride bolus (0 mLs Intravenous Stopped 3/1/21 0300)   iopamidol (ISOVUE-370) 76 % injection 75 mL (75 mLs Intravenous Given 3/1/21 0159)   ketorolac (TORADOL) injection 15 mg (15 mg Intravenous Given 3/1/21 0300)        CLINICAL IMPRESSION  1. Symptomatic cholelithiasis    2. Intractable pain        Blood pressure 101/60, pulse 67, temperature 98.3 °F (36.8 °C), temperature source Oral, resp. rate 16, height 5' 8\" (1.727 m), weight 165 lb 14.4 oz (75.3 kg), SpO2 97 %. Patient was given scripts for the following medications. I counseled patient how to take these medications. Current Discharge Medication List          Follow-up with:  No follow-up provider specified. DISCLAIMER: This chart was created using Dragon dictation software. Efforts were made by me to ensure accuracy, however some errors may be present due to limitations of this technology and occasionally words are not transcribed correctly.        Terrence [place]  03/01/21 7709

## 2021-03-01 NOTE — PROGRESS NOTES
Consult has been called to benny on 3/1/21. Spoke with suzanne.  10:04 AM    Juvenal Rodriguez  3/1/2021

## 2021-03-01 NOTE — FLOWSHEET NOTE
03/01/21 0507   Vital Signs   Temp 98.3 °F (36.8 °C)   Temp Source Oral   Pulse 67   Heart Rate Source Monitor   Resp 16   /60   BP Location Right upper arm   Patient Position Semi fowlers   Level of Consciousness Alert (0)   MEWS Score 1   Patient Currently in Pain Denies   Height and Weight   Height 5' 8\" (1.727 m)   Weight 165 lb 14.4 oz (75.3 kg)   Weight Method Bed scale   BSA (Calculated - sq m) 1.9 sq meters   BMI (Calculated) 25.3   Pain Assessment   Pain Assessment 0-10   Pain Level 1   Patient's Stated Pain Goal 1   Pain Type Acute pain   Pain Location Abdomen   Pain Orientation Right; Lower   Pain Descriptors Dull;Discomfort   Pain Frequency Intermittent   Pain Onset On-going   Clinical Progression Not changed   Functional Pain Assessment Activities are not prevented   Non-Pharmaceutical Pain Intervention(s) Rest   Oxygen Therapy   SpO2 97 %   O2 Device None (Room air)   Disaster admission questions complete at this time per pt report. Pt refusing 4eye skin assessment at this time, scattered bruising noted; slight rash to bilateral upper arms, upper chest and shoulders- not raised but pink and irritating to pt. Pt states rash has been present x3 weeks and hasn't resolved at home with bendryl or creams he's tried. Patient is able to demonstrate the ability to move from a reclining position to an upright position within the recliner. Pt pain currently 1/10; denies need for medication at this time, call light within reach. Will continue to monitor.   Willam Carranza RN

## 2021-03-01 NOTE — PROGRESS NOTES
Per Roberto Cos with Urology, pt will likely be seen tomorrow. Made PA aware that pt is currently down for lap choley and will likely be discharged pending post op status of pt. Per PA, this is fine and pt can follow up as outpt if he is discharged.

## 2021-03-01 NOTE — PROGRESS NOTES
PT TRANSFERRED TO [address] [place] [address] BED IN STABLE CONDITION; DENIES ANY PAIN; FAMILY WAS SENT UP TO WAIT IN PT'S ROOM AFTER SPEAKING WITH DR. Poppy Boyd

## 2021-03-01 NOTE — ANESTHESIA PRE PROCEDURE
Department of Anesthesiology  Preprocedure Note       Name:  Leobardo Rueda   Age:  64 y.o.  :  1960                                          MRN:  [id number]         Date:  3/1/2021      Surgeon: Rhona Huston):  Mitchel Mullen MD    Procedure: Procedure(s):  LAPAROSCOPIC CHOLECYSTECTOMY, POSSIBLE CHOLANGIOGRAMS, POSSIBLE CONVENTIONAL CHOLECYSTECTOMY    Medications prior to admission:   Prior to Admission medications    Medication Sig Start Date End Date Taking?  Authorizing Provider   loratadine (CLARITIN) 10 MG tablet Take 10 mg by mouth daily   Yes Historical Provider, MD       Current medications:    Current Facility-Administered Medications   Medication Dose Route Frequency Provider Last Rate Last Admin    sodium chloride flush 0.9 % injection 10 mL  10 mL Intravenous 2 times per day Rush Wolf MD        sodium chloride flush 0.9 % injection 10 mL  10 mL Intravenous PRN Rush Wolf MD   10 mL at 21 0541    enoxaparin (LOVENOX) injection 40 mg  40 mg Subcutaneous Daily Rush Wolf MD   40 mg at 21 0939    promethazine (PHENERGAN) tablet 12.5 mg  12.5 mg Oral Q6H PRN Rush Wolf MD        Or    ondansetron [facility]) injection 4 mg  4 mg Intravenous Q6H PRN Rush Wolf MD        polyethylene glycol [facility]) packet 17 g  17 g Oral Daily PRN Rush Wolf MD        acetaminophen (TYLENOL) tablet 650 mg  650 mg Oral Q6H PRN Rush Wolf MD        Or    acetaminophen (TYLENOL) suppository 650 mg  650 mg Rectal Q6H PRN Rush Wolf MD        0.9 % sodium chloride infusion   Intravenous Continuous Rush Wolf MD 75 mL/hr at 21 0540 New Bag at 21 0540    ciprofloxacin (CIPRO) IVPB 400 mg  400 mg Intravenous Q12H Rush Wolf MD   Stopped at 21 4205    metronidazole (FLAGYL) 500 mg in NaCl 100 mL IVPB premix  500 mg Intravenous Santos Castellanos MD   Stopped at 21 1803  morphine (PF) injection 2 mg  2 mg Intravenous Q4H PRN Stella Fish MD           Allergies: Allergies   Allergen Reactions    Codeine Nausea Only       Problem List:    Patient Active Problem List   Diagnosis Code    Symptomatic cholelithiasis K80.20    Abnormal CT of the abdomen R93.5    Leukocytosis D72.829    [facility] J44.9       Past Medical History:        Diagnosis Date    Emphysema lung ([facility]        Past Surgical History:  History reviewed. No pertinent surgical history. Social History:    Social History     Tobacco Use    Smoking status: Current Every Day Smoker     Packs/day: 0.50    Smokeless tobacco: Never Used   Substance Use Topics    Alcohol use: Not Currently                                Ready to quit: Yes  Counseling given: Yes      Vital Signs (Current):   Vitals:    03/01/21 0145 03/01/21 0230 03/01/21 0507 03/01/21 0830   BP: 124/75 112/76 101/60 120/71   Pulse: 75 60 67 67   Resp: 18 16 16 18   Temp:   98.3 °F (36.8 °C) 97.1 °F (36.2 °C)   TempSrc:   Oral Oral   SpO2: 96% 93% 97%    Weight:   165 lb 14.4 oz (75.3 kg)    Height:   5' 8\" (1.727 m)                                               BP Readings from Last 3 Encounters:   03/01/21 120/71       NPO Status: Time of last liquid consumption: 0500                        Time of last solid consumption: 1200                        Date of last liquid consumption: 03/01/21                        Date of last solid food consumption: 02/28/21    BMI:   Wt Readings from Last 3 Encounters:   03/01/21 165 lb 14.4 oz (75.3 kg)     Body mass index is 25.23 kg/m².     CBC:   Lab Results   Component Value Date    WBC 13.9 03/01/2021    RBC 4.77 03/01/2021    HGB 14.6 03/01/2021    HCT 43.8 03/01/2021    MCV 91.8 03/01/2021    RDW 14.0 03/01/2021     03/01/2021       CMP:   Lab Results   Component Value Date     03/01/2021    K 4.3 03/01/2021     03/01/2021    CO2 23 03/01/2021 BUN 12 03/01/2021    CREATININE 0.7 03/01/2021    GFRAA >60 03/01/2021    LABGLOM >60 03/01/2021    GLUCOSE 124 03/01/2021    PROT 7.4 03/01/2021    CALCIUM 9.6 03/01/2021    BILITOT <0.2 03/01/2021    ALKPHOS 87 03/01/2021    AST 20 03/01/2021    ALT 16 03/01/2021       POC Tests: No results for input(s): POCGLU, POCNA, POCK, POCCL, POCBUN, POCHEMO, POCHCT in the last 72 hours. Coags: No results found for: PROTIME, INR, APTT    HCG (If Applicable): No results found for: PREGTESTUR, PREGSERUM, HCG, HCGQUANT     ABGs: No results found for: PHART, PO2ART, EOA4YED, ZOI0AIN, BEART, W0NHRMTR     Type & Screen (If Applicable):  No results found for: LABABO, LABRH    Drug/Infectious Status (If Applicable):  No results found for: HIV, HEPCAB    COVID-19 Screening (If Applicable): No results found for: COVID19      Anesthesia Evaluation  Patient summary reviewed and Nursing notes reviewed no history of anesthetic complications:   Airway: Mallampati: II  TM distance: >3 FB   Neck ROM: full  Mouth opening: > = 3 FB Dental:    (+) edentulous      Pulmonary:   (+) COPD: mild,                             Cardiovascular:Negative CV ROS                      Neuro/Psych:   Negative Neuro/Psych ROS              GI/Hepatic/Renal: Neg GI/Hepatic/Renal ROS       (-) GERD, liver disease and no renal disease       Endo/Other: Negative Endo/Other ROS       (-) diabetes mellitus               Abdominal:           Vascular: negative vascular ROS. Anesthesia Plan      general     ASA 2     (I discussed with the patient the risks and benefits of PIV, general anesthesia, IV Narcotics, PACU. All questions were answered the patient agrees with the plan)  Induction: intravenous. MIPS: Prophylactic antiemetics administered. Anesthetic plan and risks discussed with patient and spouse. Plan discussed with CRNA.                   Omero Marinelli MD   3/1/2021

## 2021-03-01 NOTE — PROGRESS NOTES
Pt returned to [address] from PACU. Pt is alert & oriented. VSS, Abd lap sites X4. Dsg. Remain D/I. He is w/out evidence of or c/o discomfort or distress @ this time.

## 2021-03-02 ENCOUNTER — CARE COORDINATION (OUTPATIENT)
Dept: CASE MANAGEMENT | Age: 61
End: 2021-03-02

## 2021-03-02 NOTE — CARE COORDINATION
Patient contacted regarding Amilcar Olesya. Call within 2 business days of discharge: Yes  Discharge Date: 3/1/21   RARS: Readmission Risk Score: 7    COVID-19 testing was negative on 3/1/2021. Patient has following risk factors of: COPD. 1st attempt - unable to reach or leave message. Only listed number does not work \"call cannot be completed as dialed\".      [facility] follow up appointment(s):   Future Appointments   Date Time Provider Angel Acevedo   3/15/2021  1:30 PM Jesse Goodman MD [facility]     Felicita Salazar RN  Care Transitions Nurse  [phone] mobile

## 2021-03-02 NOTE — DISCHARGE SUMMARY
Name:  Jose Maurer  Room:  0201/0201-01  MRN:    [id number]    Discharge Summary      This discharge summary is in conjunction with a complete physical exam done on the day of discharge. Discharging Provider: Ariel Pisano  Attending Physician: Christen Carreon MD     Admit: 3/1/2021  Discharge:  3/1/2021    HPI taken from admission H&P:    The patient is a 64 y.o. male with a PMH of COPD who presented to [facility] ED with complaint of right lower abdominal pain that started after he ate around noon yesterday. Pt reports he had eaten scrambled eggs and orange rolls. Pt denies ever having had pain like this before. Reported having dry heaves but no vomiting, diarrhea. His last BM was ~ 1 pm yesterday. Reported having \"the shivers\" a couple times yesterday but no documented fevers. Pt reports that his abdominal pain lasted for several hours. It improved with pain medications. He states today his pain is resolved.      He does report intermittent cigarette use.      Diagnoses this Admission and Hospital Course     Abdominal Pain  Symptomatic Cholelithiasis  - CT abdomen: cholelithiasis without evidence of acute cholecytitis  - LFTs normal, pt afebrile  - Admitted to Med Surg  - checked PCT: 0.03  - Pt was NPO, given IVF, covered with Cipro and Flagyl D#1, PRN morphine and anti-emetics  - General Surgery consulted  - s/p laparoscopic cholecystectomy on 3/1  - discharged in stable condition on PRN pain meds - as below  - f/u with general surgery OP     Leukocytosis  - 13.9  - 2/2 above vs reactive     Abnormal CT abdomen  - showed circumferential bladder wall thickening with question of mild surrounding inflammatory change  - denied gross hematuria, urgency, frequency or dysuria; UA w/ small blood  - urology consulted  - plans to f/u OP in 2-3 weeks, will need OP cystoscopy     COPD  - no AE  - w/ wheezing on exam but pt denied any dyspnea     Tobacco Dependence  - Recommended cessation    Procedures (Please Review Full Report for Details)  Laparoscopic Cholecystectomy    Consults    Urology  General Surgery    Physical Exam at Discharge:    /72   Pulse 68   Temp 98.2 °F (36.8 °C) (Oral)   Resp 16   Ht 5' 8\" (1.727 m)   Wt 165 lb 14.4 oz (75.3 kg)   SpO2 95%   BMI 25.23 kg/m²   Gen: No distress. Alert. Elderly  male, resting comfortably   Eyes: No sclera icterus. No conjunctival injection. Neck: Trachea midline. Resp: No accessory muscle use. No crackles. + diffuse expiratory wheezes. No rhonchi. On RA  CV: Regular rate. Regular rhythm. No murmur. No rub. No edema. GI: Soft, Mild right sided abdominal tenderness with deep palpation. Non-distended. Normal bowel sounds. Skin: Warm and dry. No rash on exposed extremities. Neuro: Awake. Grossly non-focal, follows commands, moves all extremities, no dysarthria   Psych: Oriented x 3. No anxiety or agitation. CBC:   Recent Labs     03/01/21  0120   WBC 13.9*   HGB 14.6   HCT 43.8   MCV 91.8        BMP:   Recent Labs     03/01/21  0120      K 4.3      CO2 23   BUN 12   CREATININE 0.7*     LIVER PROFILE:   Recent Labs     03/01/21  0120   AST 20   ALT 16   LIPASE 30.0   BILIDIR <0.2   BILITOT <0.2   ALKPHOS 87     UA:  Recent Labs     03/01/21  0120   COLORU Yellow   PHUR 6.0   WBCUA 3-5   RBCUA 3-4   MUCUS 2+*   CLARITYU Clear   SPECGRAV >=1.030   LEUKOCYTESUR Negative   UROBILINOGEN 0.2   BILIRUBINUR Negative   BLOODU SMALL*   GLUCOSEU Negative     CULTURES    RAPID COVID: negative    RADIOLOGY    CT ABDOMEN PELVIS W IV CONTRAST Additional Contrast? None 3/1/2021   Final Result   1. Circumferential bladder wall thickening with question of mild surrounding   inflammatory change. Findings could indicate cystitis. Recommend   correlation with urinalysis. 2. Cholelithiasis without evidence of acute cholecystitis.            Discharge Medications     Medication List      START taking these medications    oxyCODONE 5 MG immediate release tablet  Commonly known as: ROXICODONE  Take 1 tablet by mouth every 4 hours as needed for Pain for up to 5 days. Notes to patient: S/E: constipation, dizziness, upset stomach. CONTINUE taking these medications    Claritin 10 MG tablet  Generic drug: loratadine           Where to Get Your Medications      You can get these medications from any pharmacy    Bring a paper prescription for each of these medications  · oxyCODONE 5 MG immediate release tablet           Discharged in stable condition to home. Follow Up: Follow up with PCP in 1 week. F/u with Urology in 2-3 weeks REJI Crump PA-C 9:10 AM 3/2/2021

## 2021-03-03 ENCOUNTER — CARE COORDINATION (OUTPATIENT)
Dept: CASE MANAGEMENT | Age: 61
End: 2021-03-03

## 2021-03-03 NOTE — CARE COORDINATION
Patient contacted regarding COVID-19 risk. COVID-19 Not Detected prior to lap earle. Call within 2 business days of discharge: Yes  Discharge Date: 3/1/21   RARS: Readmission Risk Score: 7    Patient has following risk factors of: COPD.     2nd attempt - unable to reach or leave message. Only listed number is a non working number. No HIPAA form on file, no PCP. No further CTN outreach scheduled. Episode resolved.      Minus Catrina, RN  Care Transitions Nurse  [phone] mobile    Non-[facility] follow up appointment(s): [facility] follow up appointment(s):   Future Appointments   Date Time Provider Angel Acevedo   3/15/2021  1:30 PM MD PRASHANTH Chen&ANJELICA MMA

## 2021-03-05 NOTE — OP NOTE
[address]                 [address] [address]                                OPERATIVE REPORT    PATIENT NAME: Francies Nyhan                       :        1960  MED REC NO:   [id number]                          ROOM:       0201  ACCOUNT NO:   [de-identified]                           ADMIT DATE: 2021  PROVIDER:     Tomi John MD    DATE OF PROCEDURE:  2021    PREOPERATIVE DIAGNOSIS:  Acute calculous cholecystitis. POSTOPERATIVE DIAGNOSIS:  Acute calculous cholecystitis. OPERATION PERFORMED:  Laparoscopic cholecystectomy. SURGEON:  Tomi John MD    ANESTHESIA:  General.    COMPLICATIONS:  None. ESTIMATED BLOOD LOSS:  Less than 50 mL. INDICATIONS FOR THE OPERATION:  A 71-year-old male, who had an  intractable upper abdominal pain. He was admitted to the hospital.   Today, he feels improved. Imaging suggested cholecystitis. He has  gallstones. I recommended operative intervention. The risks and  benefits were explained. The patient understood them, accepted them,  and elected to proceed. DESCRIPTION OF OPERATION:  The patient was brought to the operating  room. General anesthesia was induced. He was prepped and draped in  usual surgical sterile fashion. A vertical supraumbilical incision was  made with a knife. Subcutaneous tissues were spread. Penetrating towel  clip was used to elevate the anterior abdominal wall. The Veress needle  was inserted. Pneumoperitoneum was established. A disposable 5 mm  trocar was passed through the incision. The laparoscope was inserted. Under direct vision, an 11 mm port was placed in the epigastrium and two  5 mm ports in the right upper quadrant. We had adequate visualization  and retraction. I identified the cystic duct as it tapered into the  gallbladder.   Three clips were placed away from the gallbladder, one  clip next to the gallbladder and the cystic duct was divided. Cystic  artery had two clips placed proximally, one clip distally, and it was  divided. Posterior branch of the artery was clipped as well. The  gallbladder was dissected from the gallbladder fossa of the liver bed. Gallbladder was brought out through the epigastric incision. I  reinspected the right upper quadrant. I copiously irrigated the area. I suctioned out the irrigant. There was no evident bleeding, bile leak,  or complication. I deflated the abdomen and removed the trocars. The  fascia at the epigastric port site was reapproximated with 0 Vicryl  suture. Local anesthetic was infiltrated. 4-0 Vicryl was used to  reapproximate the skin at all the incisions. Benzoin and Steri-Strip  dressing were placed. DISPOSITION:  The patient tolerated the procedure without any acute  complication.         Candi Patino MD    D: 03/05/2021 12:21:37       T: 03/05/2021 12:33:20     [id number]  Job#: [id number]     Doc#: [id number]    CC:

## 2021-03-15 ENCOUNTER — OFFICE VISIT (OUTPATIENT)
Dept: SURGERY | Age: 61
End: 2021-03-15

## 2021-03-15 VITALS
DIASTOLIC BLOOD PRESSURE: 86 MMHG | TEMPERATURE: 97.8 F | HEIGHT: 68 IN | WEIGHT: 170.2 LBS | BODY MASS INDEX: 25.79 KG/M2 | SYSTOLIC BLOOD PRESSURE: 122 MMHG

## 2021-03-15 DIAGNOSIS — Z09 POSTOP CHECK: Primary | ICD-10-CM

## 2021-03-15 PROCEDURE — 99024 POSTOP FOLLOW-UP VISIT: CPT | Performed by: NURSE PRACTITIONER

## 2021-03-15 NOTE — PROGRESS NOTES
Pt is S/P lap earle on 3/1/21. He is here today for post-op follow-up. Pt is tolerating a diet and having bowel function. He has more tenderness at his epigastric incision than the other incisions. ABD: soft, incisions looks good, no N/V, no distention, + Bms, + flatus. Plan: Pt looks good post-op lap earle. F/U PRN.     Electronically signed by DYLAN Perez CNP on 3/15/2021 at 2:34 PM

## 2021-07-24 ENCOUNTER — APPOINTMENT (OUTPATIENT)
Dept: GENERAL RADIOLOGY | Age: 61
End: 2021-07-24
Payer: COMMERCIAL

## 2021-07-24 ENCOUNTER — HOSPITAL ENCOUNTER (EMERGENCY)
Age: 61
Discharge: HOME | End: 2021-07-24
Attending: STUDENT IN AN ORGANIZED HEALTH CARE EDUCATION/TRAINING PROGRAM
Payer: COMMERCIAL

## 2021-07-24 VITALS
BODY MASS INDEX: 23.34 KG/M2 | SYSTOLIC BLOOD PRESSURE: 109 MMHG | OXYGEN SATURATION: 99 % | HEIGHT: 68 IN | DIASTOLIC BLOOD PRESSURE: 68 MMHG | TEMPERATURE: 98.4 F | WEIGHT: 154 LBS | RESPIRATION RATE: 20 BRPM | HEART RATE: 78 BPM

## 2021-07-24 DIAGNOSIS — R55 NEAR SYNCOPE: ICD-10-CM

## 2021-07-24 DIAGNOSIS — R07.9 ACUTE CHEST PAIN: ICD-10-CM

## 2021-07-24 DIAGNOSIS — J44.1 COPD EXACERBATION (HCC): Primary | ICD-10-CM

## 2021-07-24 LAB
A/G RATIO: 1.4 (ref 1.1–2.2)
ALBUMIN SERPL-MCNC: 4 G/DL (ref 3.4–5)
ALP BLD-CCNC: 81 U/L (ref 40–129)
ALT SERPL-CCNC: 10 U/L (ref 10–40)
ANION GAP SERPL CALCULATED.3IONS-SCNC: 14 MMOL/L (ref 3–16)
AST SERPL-CCNC: 21 U/L (ref 15–37)
BASOPHILS ABSOLUTE: 0 K/UL (ref 0–0.2)
BASOPHILS RELATIVE PERCENT: 0 %
BILIRUB SERPL-MCNC: 0.3 MG/DL (ref 0–1)
BUN BLDV-MCNC: 16 MG/DL (ref 7–20)
CALCIUM SERPL-MCNC: 9.1 MG/DL (ref 8.3–10.6)
CHLORIDE BLD-SCNC: 105 MMOL/L (ref 99–110)
CO2: 17 MMOL/L (ref 21–32)
CREAT SERPL-MCNC: 0.7 MG/DL (ref 0.8–1.3)
EOSINOPHILS ABSOLUTE: 0.1 K/UL (ref 0–0.6)
EOSINOPHILS RELATIVE PERCENT: 1 %
GFR AFRICAN AMERICAN: >60
GFR NON-AFRICAN AMERICAN: >60
GLOBULIN: 2.8 G/DL
GLUCOSE BLD-MCNC: 190 MG/DL (ref 70–99)
HCT VFR BLD CALC: 40.9 % (ref 40.5–52.5)
HEMOGLOBIN: 13.6 G/DL (ref 13.5–17.5)
LYMPHOCYTES ABSOLUTE: 1.9 K/UL (ref 1–5.1)
LYMPHOCYTES RELATIVE PERCENT: 22 %
MCH RBC QN AUTO: 30.5 PG (ref 26–34)
MCHC RBC AUTO-ENTMCNC: 33.2 G/DL (ref 31–36)
MCV RBC AUTO: 91.7 FL (ref 80–100)
MONOCYTES ABSOLUTE: 0.7 K/UL (ref 0–1.3)
MONOCYTES RELATIVE PERCENT: 8 %
NEUTROPHILS ABSOLUTE: 5.9 K/UL (ref 1.7–7.7)
NEUTROPHILS RELATIVE PERCENT: 69 %
PDW BLD-RTO: 14.6 % (ref 12.4–15.4)
PLATELET # BLD: 244 K/UL (ref 135–450)
PMV BLD AUTO: 7.5 FL (ref 5–10.5)
POTASSIUM REFLEX MAGNESIUM: 3.9 MMOL/L (ref 3.5–5.1)
PRO-BNP: 130 PG/ML (ref 0–124)
RBC # BLD: 4.46 M/UL (ref 4.2–5.9)
SLIDE REVIEW: NORMAL
SODIUM BLD-SCNC: 136 MMOL/L (ref 136–145)
TOTAL PROTEIN: 6.8 G/DL (ref 6.4–8.2)
TROPONIN: <0.01 NG/ML
WBC # BLD: 8.6 K/UL (ref 4–11)

## 2021-07-24 PROCEDURE — 83880 ASSAY OF NATRIURETIC PEPTIDE: CPT

## 2021-07-24 PROCEDURE — 85025 COMPLETE CBC W/AUTO DIFF WBC: CPT

## 2021-07-24 PROCEDURE — 93005 ELECTROCARDIOGRAM TRACING: CPT | Performed by: PHYSICIAN ASSISTANT

## 2021-07-24 PROCEDURE — 71046 X-RAY EXAM CHEST 2 VIEWS: CPT

## 2021-07-24 PROCEDURE — 84484 ASSAY OF TROPONIN QUANT: CPT

## 2021-07-24 PROCEDURE — 80053 COMPREHEN METABOLIC PANEL: CPT

## 2021-07-24 PROCEDURE — 99283 EMERGENCY DEPT VISIT LOW MDM: CPT

## 2021-07-24 RX ORDER — PREDNISONE 50 MG/1
50 TABLET ORAL DAILY
Qty: 5 TABLET | Refills: 0 | Status: SHIPPED | OUTPATIENT
Start: 2021-07-24 | End: 2021-07-29

## 2021-07-24 RX ORDER — ALBUTEROL SULFATE 90 UG/1
2 AEROSOL, METERED RESPIRATORY (INHALATION) 4 TIMES DAILY PRN
Qty: 1 INHALER | Refills: 0 | Status: SHIPPED | OUTPATIENT
Start: 2021-07-24

## 2021-07-24 ASSESSMENT — HEART SCORE
ECG: 0
ECG: 0

## 2021-07-24 ASSESSMENT — ENCOUNTER SYMPTOMS
GASTROINTESTINAL NEGATIVE: 1
RESPIRATORY NEGATIVE: 1

## 2021-07-24 NOTE — ED TRIAGE NOTES
Chief Complaint   Patient presents with    Chest Pain     pt c/o sudden onset chest tightness and vision changes onset 20 min ago while driving, states symptoms have currently resolved

## 2021-07-24 NOTE — ED PROVIDER NOTES
[address] ED  EMERGENCY DEPARTMENT ENCOUNTER        Pt Name: John Jimenez  MRN: [id number]  [place] 1960  Date of evaluation: 7/24/2021  Provider: Zahraa Serrano PA-C  PCP: No primary care provider on file. Note Started: 5:52 PM EDT        I have seen and evaluated this patient with my supervising physician Caden Dennis MD.    [address] [facility]       Chief Complaint   Patient presents with    Chest Pain     pt c/o sudden onset chest tightness and vision changes onset 20 min ago while driving, states symptoms have currently resolved       HISTORY OF PRESENT ILLNESS   (Location, Timing/Onset, Context/Setting, Quality, Duration, Modifying Factors, Severity, Associated Signs and Symptoms)  Note limiting factors. Chief Complaint: Chest Pain     John Jimenez is a 64 y.o. male a past medical history of tobacco use, COPD brought in today by private vehicle with complaints of chest tightness. He was driving just prior to arrival and he was making a left turn when he a sudden chest tightness. He states that his vision went black and he veered to the left side of the road. It resolved within a few minutes. He denies any complaints at this time. Onset occurred just prior to arrival to the ED. Duration symptoms have been persistent since onset. Context includes chest tightness. He denies shortness of breath. Denies fevers or chills nausea or vomiting. Denies any numbness or tingling. Denies any vision changes or headache at this time. Denies any neck pain or stiffness. No aggravating symptoms. No alleviating symptoms. Currently denies any pain. He has not taken anything at home prior to arrival for pain control. Nothing seems to make symptoms better or worse. Nursing Notes were all reviewed and agreed with or any disagreements were addressed in the HPI.     REVIEW OF SYSTEMS    (2-9 systems for level 4, 10 or more for level 5)     Review of Systems   Constitutional: Negative. HENT: Negative. Respiratory: Negative. Cardiovascular: Positive for chest pain. Gastrointestinal: Negative. Genitourinary: Negative. Musculoskeletal: Negative. Skin: Negative. Neurological: Negative. Positives and Pertinent negatives as per HPI. Except as noted above in the ROS, all other systems were reviewed and negative. PAST MEDICAL HISTORY     Past Medical History:   Diagnosis Date    Emphysema lung [facility])          SURGICAL HISTORY     Past Surgical History:   Procedure Laterality Date    CHOLECYSTECTOMY, LAPAROSCOPIC N/A 03/01/2021    CHOLECYSTECTOMY, LAPAROSCOPIC N/A 3/1/2021    LAPAROSCOPIC CHOLECYSTECTOMY performed by Venus Scott MD at [facility]       Discharge Medication List as of 7/24/2021  7:58 PM      CONTINUE these medications which have NOT CHANGED    Details   loratadine (CLARITIN) 10 MG tablet Take 10 mg by mouth dailyHistorical Med               ALLERGIES     Codeine    FAMILYHISTORY     History reviewed. No pertinent family history. SOCIAL HISTORY       Social History     Tobacco Use    Smoking status: Current Every Day Smoker     Packs/day: 0.50     Types: Cigarettes    Smokeless tobacco: Never Used   Vaping Use    Vaping Use: Never used   Substance Use Topics    Alcohol use: Not Currently    Drug use: Never       SCREENINGS   NIH Stroke Scale  NIH Stroke Scale Assessed: Yes  Interval: Baseline  Level of Consciousness (1a. ): Alert  LOC Questions (1b. ):  Answers both correctly  LOC Commands (1c. ): Performs both tasks correctly  Best Gaze (2. ): Normal  Visual (3. ): No visual loss  Facial Palsy (4. ): Normal symmetrical movement  Motor Arm, Left (5a. ): No drift  Motor Arm, Right (5b. ): No drift  Motor Leg, Left (6a. ): No drift  Motor Leg, Right (6b. ): No drift  Limb Ataxia (7. ): Absent  Sensory (8. ): Normal  Best Language (9. ): No aphasia  Dysarthria (10. ): Normal  Extinction and Inattention (11): No abnormality  Total: 0  Heart Score for chest pain patients  History: Moderately Suspicious  ECG: Normal  Patient Age: > 39 and < 65 years  *Risk factors for Atherosclerotic disease: Cigarette smoking, Hypercholesterolemia  Risk Factors: 1 or 2 risk factors  Troponin: < 1X normal limit  Heart Score Total: 3      PHYSICAL EXAM    (up to 7 for level 4, 8 or more for level 5)     ED Triage Vitals   BP Temp Temp src Pulse Resp SpO2 Height Weight   -- -- -- -- -- -- -- --       Physical Exam  Vitals and nursing note reviewed. Constitutional:       General: He is awake. He is not in acute distress. Appearance: Normal appearance. He is well-developed. He is not ill-appearing, toxic-appearing or diaphoretic. Interventions: He is not intubated. HENT:      Head: Normocephalic and atraumatic. Nose: Nose normal.   Eyes:      General:         Right eye: No discharge. Left eye: No discharge. Cardiovascular:      Rate and Rhythm: Normal rate and regular rhythm. Pulses:           Radial pulses are 2+ on the right side and 2+ on the left side. Heart sounds: Normal heart sounds. No murmur heard. No gallop. Pulmonary:      Effort: Pulmonary effort is normal. No tachypnea, bradypnea, accessory muscle usage, prolonged expiration, respiratory distress or retractions. He is not intubated. Breath sounds: Wheezing present. No decreased breath sounds, rhonchi or rales. Chest:      Chest wall: No tenderness. Musculoskeletal:         General: No deformity. Normal range of motion. Cervical back: Normal range of motion and neck supple. Right lower leg: No edema. Left lower leg: No edema. Skin:     General: Skin is warm and dry. Neurological:      General: No focal deficit present. Mental Status: He is alert and oriented to person, place, and time. GCS: GCS eye subscore is 4. GCS verbal subscore is 5. GCS motor subscore is 6.       Cranial Nerves: Cranial nerves are intact. Sensory: Sensation is intact. Motor: Motor function is intact. Coordination: Coordination is intact. Gait: Gait is intact. Psychiatric:         Behavior: Behavior normal. Behavior is cooperative. DIAGNOSTIC RESULTS   LABS:    Labs Reviewed   COMPREHENSIVE METABOLIC PANEL W/ REFLEX TO MG FOR LOW K - Abnormal; Notable for the following components:       Result Value    CO2 17 (*)     Glucose 190 (*)     CREATININE 0.7 (*)     All other components within normal limits    Narrative:     Performed at:  [facility]) [facility] [address],  [place], [facility]   Phone [phone]   BRAIN NATRIURETIC PEPTIDE - Abnormal; Notable for the following components:    Pro- (*)     All other components within normal limits    Narrative:     Performed at:  [facility] [address],  [place], [facility]   Phone [phone]   CBC WITH AUTO DIFFERENTIAL    Narrative:     Performed at:  [place] [address],  [place], [facility]   Phone [phone]   TROPONIN    Narrative:     Performed at:  [facility]) - [facility] [address],  [place], [facility]   Phone [phone]       When ordered only abnormal lab results are displayed. All other labs were within normal range or not returned as of this dictation. EKG: When ordered, EKG's are interpreted by the Emergency Department Physician in the absence of a cardiologist.  Please see their note for interpretation of EKG.     RADIOLOGY:   Non-plain film images such as CT, Ultrasound and MRI are read by the radiologist. Plain radiographic images are visualized and preliminarily interpreted by the ED Provider with the below findings:        Interpretation per the Radiologist below, if available at the time of this note:    XR CHEST (2 VW)   Final Result   Chronic obstructive lung changes with probable bibasilar fibrosis and   scarring or less likely early atelectasis or infiltrates. Recommend   short-term follow-up. Questionable tiny effusions or pleural thickening along the lung bases           No results found. PROCEDURES   Unless otherwise noted below, none     Procedures    CRITICAL CARE TIME   N/A    CONSULTS:  None      EMERGENCY DEPARTMENT COURSE and DIFFERENTIAL DIAGNOSIS/MDM:   Vitals:    Vitals:    07/24/21 1831 07/24/21 1901 07/24/21 1932 07/24/21 2011   BP: 102/72 107/78 90/63 109/68   Pulse: 83 71 79 78   Resp:    20   Temp:    98.4 °F (36.9 °C)   TempSrc:       SpO2: 96% 94% 96% 99%   Weight:       Height:           Patient was given the following medications:  Medications - No data to display        Patient brought in today by private vehicle with complaints of chest tightness. On exam alert oriented afebrile breathing on room air satting at 96%. Nontoxic. No acute respiratory distress. Old labs and records reviewed. Patient seen and evaluated by myself and my attending Dr. Trey Draper. EKG reviewed by my attending see note for dictation. Troponin less than 0.01. CBC shows no acute leukocytosis. Hemoglobin of 13.6. No acute electrolyte abnormalities. Kidney function unremarkable. Chest x-ray shows chronic obstructive lung changes with probable bibasilar fibrosis and scarring or less likely early atelectasis or infiltrates recommend short-term follow-up. Questionable tiny effusions or pleural thickening along the lung bases. Heart score of 3. NIH of 0. Plan at this time will be to discharge home with outpatient referral to pulmonology and PCP. Patient given steroids and breathing treatments at time of discharge. Patient given strict return precautions including but not limited to increased chest pain shortness of breath lightheadedness dizziness numbness or tingling or any new or worsening symptoms. We did have shared decision making.   He verbalized understanding of this plan. I did feel comfortable sending this patient home with close follow-up instructions and strict return precautions. Patient was discharged in stable condition. FINAL IMPRESSION      1. COPD exacerbation ([address]    2. Near syncope    3. Acute chest pain          DISPOSITION/PLAN   DISPOSITION        PATIENT REFERRED TO:  Sarah Morse MD  [address], [address]  [phone]    In 2 days  for evaluation for stress test.    Ez Dhaliwal MD  [address] [place] [address] [address]  [phone]    In 1 week  for evaluation and management of chronic lung scarring and COPD.     [facility]  [phone]        [facility] ([facility] ED  [address] [facility]  [phone]    If symptoms worsen      DISCHARGE MEDICATIONS:  Discharge Medication List as of 7/24/2021  7:58 PM      START taking these medications    Details   predniSONE (DELTASONE) 50 MG tablet Take 1 tablet by mouth daily for 5 days, Disp-5 tablet, R-0Print      albuterol sulfate HFA (VENTOLIN HFA) 108 (90 Base) MCG/ACT inhaler Inhale 2 puffs into the lungs 4 times daily as needed for Wheezing, Disp-1 Inhaler, R-0Print             DISCONTINUED MEDICATIONS:  Discharge Medication List as of 7/24/2021  7:58 PM                 (Please note that portions of this note were completed with a voice recognition program.  Efforts were made to edit the dictations but occasionally words are mis-transcribed.)    Yumiko Cline PA-C (electronically signed)            Yumiko Cline PA-C  07/24/21 2029

## 2021-07-25 LAB
EKG ATRIAL RATE: 86 BPM
EKG DIAGNOSIS: NORMAL
EKG P AXIS: 65 DEGREES
EKG P-R INTERVAL: 154 MS
EKG Q-T INTERVAL: 364 MS
EKG QRS DURATION: 72 MS
EKG QTC CALCULATION (BAZETT): 435 MS
EKG R AXIS: 57 DEGREES
EKG T AXIS: 68 DEGREES
EKG VENTRICULAR RATE: 86 BPM

## 2021-07-25 PROCEDURE — 93010 ELECTROCARDIOGRAM REPORT: CPT | Performed by: INTERNAL MEDICINE

## 2021-07-26 PROBLEM — R07.2 PRECORDIAL PAIN: Status: ACTIVE | Noted: 2021-07-26

## 2021-07-26 NOTE — PROGRESS NOTES
puffs into the lungs 4 times daily as needed for Wheezing 7/24/21  Yes Ishan Barboza MD   loratadine (CLARITIN) 10 MG tablet Take 10 mg by mouth daily   Yes Historical Provider, MD   predniSONE (DELTASONE) 50 MG tablet Take 1 tablet by mouth daily for 5 days  Patient not taking: Reported on 7/27/2021 7/24/21 7/29/21  Ishan Barboza MD        Allergies:  Codeine     Review of Systems:   · Constitutional: there has been no unanticipated weight loss. There's been no change in energy level, sleep pattern, or activity level. · Eyes: No visual changes or diplopia. No scleral icterus. · ENT: No Headaches, hearing loss or vertigo. No mouth sores or sore throat. · Cardiovascular: Reviewed in HPI  · Respiratory: No cough or wheezing, no sputum production. No hematemesis. · Gastrointestinal: No abdominal pain, appetite loss, blood in stools. No change in bowel or bladder habits. · Genitourinary: No dysuria, trouble voiding, or hematuria. · Musculoskeletal:  No gait disturbance, weakness or joint complaints. · Integumentary: No rash or pruritis. · Neurological: No headache, diplopia, change in muscle strength, numbness or tingling. No change in gait, balance, coordination, mood, affect, memory, mentation, behavior. · Psychiatric: No anxiety, no depression. · Endocrine: No malaise, fatigue or temperature intolerance. No excessive thirst, fluid intake, or urination. No tremor. · Hematologic/Lymphatic: No abnormal bruising or bleeding, blood clots or swollen lymph nodes. · Allergic/Immunologic: No nasal congestion or hives.     Physical Examination:    Vitals:    07/27/21 1158   BP: 104/70   Pulse: 79   SpO2: 98%        Wt Readings from Last 3 Encounters:   07/27/21 161 lb (73 kg)   07/24/21 154 lb (69.9 kg)   03/15/21 170 lb 3.2 oz (77.2 kg)       Constitutional and General Appearance: NAD   Respiratory:  · Normal excursion and expansion without use of accessory muscles  · Resp Auscultation: wheezes, no rales Cardiovascular:  · The apical impulses not displaced  · Heart tones are crisp and normal  · Cervical veins are not engorged  · The carotid upstroke is normal in amplitude and contour without delay or bruit  · Normal S1S2, No S3, No Murmur  · Peripheral pulses are symmetrical and full  · There is no clubbing, cyanosis of the extremities. · No edema  · Femoral Arteries: 2+ and equal  · Pedal Pulses: 2+ and equal   Abdomen:  · No masses or tenderness  · Liver/Spleen: No Abnormalities Noted  Neurological/Psychiatric:  · Alert and oriented in all spheres  · Moves all extremities well  · Exhibits normal gait balance and coordination  · No abnormalities of mood, affect, memory, mentation, or behavior are noted    EKG 7/24/21  Normal sinus rhythmNormal ECGNo previous ECGs availableConfirmed by Demian Padilla ([id number]) on 7/25/2021 10:04:32 AM    Assessment:   Chest pain - typical/atypical  Smoking  SOB - chronic, more pulm than cardiac   Wheezing due to smoking, lung dz    Plan:  Smoking cessation encouraged  Stress echo  Regular exercise and following a healthy diet encouraged   Follow up with me   Follow up  pending test results      Thank you for allowing me to participate in the care of this individual.     This note was scribed in the presence of Dr. Toya Bosworth by Bishop Alfred RN    The scribes documentation has been prepared under my direction and personally reviewed by me in its entirety. I confirm that the note above accurately reflects all work, treatment, procedures, and medical decision making performed by me. Dr. Toya Bosworth, MD [place].  Isaiah Butcher M.D., [place]

## 2021-07-27 ENCOUNTER — OFFICE VISIT (OUTPATIENT)
Dept: CARDIOLOGY CLINIC | Age: 61
End: 2021-07-27
Payer: COMMERCIAL

## 2021-07-27 VITALS
OXYGEN SATURATION: 98 % | WEIGHT: 161 LBS | DIASTOLIC BLOOD PRESSURE: 70 MMHG | BODY MASS INDEX: 24.4 KG/M2 | SYSTOLIC BLOOD PRESSURE: 104 MMHG | HEIGHT: 68 IN | HEART RATE: 79 BPM

## 2021-07-27 DIAGNOSIS — R07.2 PRECORDIAL PAIN: ICD-10-CM

## 2021-07-27 PROCEDURE — 99204 OFFICE O/P NEW MOD 45 MIN: CPT | Performed by: INTERNAL MEDICINE

## 2021-07-27 NOTE — PATIENT INSTRUCTIONS
Plan:  1. Smoking cessation encouraged  2. Cardiac risk stratification education was reviewed including diet, exercise/activity, medication,and smoking  cessation  3. Will check stress echo  4.   Follow up prn pending test results

## 2021-07-27 NOTE — LETTER
[facility]   Cardiac Consultation    Referring Provider:  Nasreen Choe MD     Chief Complaint   Patient presents with    Follow-up     ER follow up    Shortness of Breath        History of Present Illness:  Mr Hudson Peaks 64 y.o. male new consult from PCP and ED with PMH COPD, current smoker was seen in the ED Saturday for chest pain that he describes as midsternal tightness with top of head numbness and vision change slight dizzy headed. He was driving at time it occurred. Resolved spontaneously. Never had before. Symptoms resolved  in 10 minutes, but it scared him and he drove to ER He was told in the ED to follow with cardiology as soon as possible and have stress test when possible He has never had these symptoms before and feels fine today   Today he reports he is feeling fine with no reoccurrence of symptoms. Today Denies chest pain, shortness of breath, edema, dizziness, palpitations and syncope. He smokes 1 pack every 3 days  He denies any exertional chest pain, tightness or SOB other than his chronic SOB from COPD. Past Medical History:   has a past medical history of Emphysema lung ([address] Surgical History:   has a past surgical history that includes Cholecystectomy, laparoscopic (N/A, 03/01/2021) and Cholecystectomy, laparoscopic (N/A, 3/1/2021). 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 He has been smoking about 0.50 packs per day. He has never used smokeless tobacco. He reports previous alcohol use. He reports that he does not use drugs. Family History: no fmily hx of heart disease  family history is not on file. Home Medications:  Prior to Admission medications    Medication Sig Start Date End Date Taking?  Authorizing Provider   albuterol sulfate HFA (VENTOLIN HFA) 108 (90 Base) MCG/ACT inhaler Inhale 2 puffs into the lungs 4 times daily as needed for Wheezing 7/24/21  Yes Nathan Baltazar MD   loratadine (CLARITIN) 10 MG tablet Take 10 mg by mouth daily   Yes Historical Provider, MD   predniSONE (DELTASONE) 50 MG tablet Take 1 tablet by mouth daily for 5 days  Patient not taking: Reported on 7/27/2021 7/24/21 7/29/21  Nathan Baltazar MD        Allergies:  Codeine     Review of Systems:   · Constitutional: there has been no unanticipated weight loss. There's been no change in energy level, sleep pattern, or activity level. · Eyes: No visual changes or diplopia. No scleral icterus. · ENT: No Headaches, hearing loss or vertigo. No mouth sores or sore throat. · Cardiovascular: Reviewed in HPI  · Respiratory: No cough or wheezing, no sputum production. No hematemesis. · Gastrointestinal: No abdominal pain, appetite loss, blood in stools. No change in bowel or bladder habits. · Genitourinary: No dysuria, trouble voiding, or hematuria. · Musculoskeletal:  No gait disturbance, weakness or joint complaints. · Integumentary: No rash or pruritis. · Neurological: No headache, diplopia, change in muscle strength, numbness or tingling. No change in gait, balance, coordination, mood, affect, memory, mentation, behavior. · Psychiatric: No anxiety, no depression. · Endocrine: No malaise, fatigue or temperature intolerance. No excessive thirst, fluid intake, or urination. No tremor. · Hematologic/Lymphatic: No abnormal bruising or bleeding, blood clots or swollen lymph nodes. · Allergic/Immunologic: No nasal congestion or hives.     Physical Examination:    Vitals:    07/27/21 1158   BP: 104/70   Pulse: 79   SpO2: 98%        Wt Readings from Last 3 Encounters:   07/27/21 161 lb (73 kg)   07/24/21 154 lb (69.9 kg)   03/15/21 170 lb 3.2 oz (77.2 kg)       Constitutional and General Appearance: NAD   Respiratory:  · Normal excursion and expansion without use of accessory muscles  · Resp Auscultation: wheezes, no rales Cardiovascular:  · The apical impulses not displaced  · Heart tones are crisp and normal  · Cervical veins are not engorged  · The carotid upstroke is normal in amplitude and contour without delay or bruit  · Normal S1S2, No S3, No Murmur  · Peripheral pulses are symmetrical and full  · There is no clubbing, cyanosis of the extremities. · No edema  · Femoral Arteries: 2+ and equal  · Pedal Pulses: 2+ and equal   Abdomen:  · No masses or tenderness  · Liver/Spleen: No Abnormalities Noted  Neurological/Psychiatric:  · Alert and oriented in all spheres  · Moves all extremities well  · Exhibits normal gait balance and coordination  · No abnormalities of mood, affect, memory, mentation, or behavior are noted    EKG 7/24/21  Normal sinus rhythmNormal ECGNo previous ECGs availableConfirmed by Jarred Neff ([id number]) on 7/25/2021 10:04:32 AM    Assessment:   Chest pain - typical/atypical  Smoking  SOB - chronic, more pulm than cardiac   Wheezing due to smoking, lung dz    Plan:  Smoking cessation encouraged  Stress echo  Regular exercise and following a healthy diet encouraged   Follow up with me   Follow up  pending test results      Thank you for allowing me to participate in the care of this individual.     This note was scribed in the presence of Dr. Elsi Allen by Marleny Lovett RN    The scribes documentation has been prepared under my direction and personally reviewed by me in its entirety. I confirm that the note above accurately reflects all work, treatment, procedures, and medical decision making performed by me. MD [place] Conteh.  Kristy Kimball M.D., Chanelle Casey

## 2021-07-30 ENCOUNTER — HOSPITAL ENCOUNTER (OUTPATIENT)
Dept: NON INVASIVE DIAGNOSTICS | Age: 61
Discharge: HOME | End: 2021-07-30
Payer: COMMERCIAL

## 2021-07-30 ENCOUNTER — TELEPHONE (OUTPATIENT)
Dept: CARDIOLOGY CLINIC | Age: 61
End: 2021-07-30

## 2021-07-30 DIAGNOSIS — R07.2 PRECORDIAL PAIN: ICD-10-CM

## 2021-07-30 LAB
LV EF: 60 %
LVEF MODALITY: NORMAL

## 2021-07-30 PROCEDURE — 93351 STRESS TTE COMPLETE: CPT

## 2022-07-13 ENCOUNTER — APPOINTMENT (OUTPATIENT)
Dept: ULTRASOUND IMAGING | Age: 62
End: 2022-07-13
Payer: COMMERCIAL

## 2022-07-13 ENCOUNTER — HOSPITAL ENCOUNTER (EMERGENCY)
Age: 62
Discharge: HOME | End: 2022-07-13
Attending: STUDENT IN AN ORGANIZED HEALTH CARE EDUCATION/TRAINING PROGRAM
Payer: COMMERCIAL

## 2022-07-13 ENCOUNTER — APPOINTMENT (OUTPATIENT)
Dept: GENERAL RADIOLOGY | Age: 62
End: 2022-07-13
Payer: COMMERCIAL

## 2022-07-13 VITALS
RESPIRATION RATE: 17 BRPM | TEMPERATURE: 98.3 F | DIASTOLIC BLOOD PRESSURE: 76 MMHG | OXYGEN SATURATION: 96 % | HEIGHT: 68 IN | BODY MASS INDEX: 24.71 KG/M2 | WEIGHT: 163 LBS | HEART RATE: 77 BPM | SYSTOLIC BLOOD PRESSURE: 108 MMHG

## 2022-07-13 DIAGNOSIS — J18.9 PNEUMONIA OF RIGHT LOWER LOBE DUE TO INFECTIOUS ORGANISM: Primary | ICD-10-CM

## 2022-07-13 LAB
A/G RATIO: 1.4 (ref 1.1–2.2)
ALBUMIN SERPL-MCNC: 4.6 G/DL (ref 3.4–5)
ALP BLD-CCNC: 162 U/L (ref 40–129)
ALT SERPL-CCNC: 17 U/L (ref 10–40)
ANION GAP SERPL CALCULATED.3IONS-SCNC: 10 MMOL/L (ref 3–16)
AST SERPL-CCNC: 19 U/L (ref 15–37)
BASE EXCESS VENOUS: -0.7 MMOL/L (ref -3–3)
BASOPHILS ABSOLUTE: 0 K/UL (ref 0–0.2)
BASOPHILS RELATIVE PERCENT: 0.4 %
BILIRUB SERPL-MCNC: 0.4 MG/DL (ref 0–1)
BUN BLDV-MCNC: 14 MG/DL (ref 7–20)
CALCIUM SERPL-MCNC: 9.7 MG/DL (ref 8.3–10.6)
CARBOXYHEMOGLOBIN: 5.8 % (ref 0–1.5)
CHLORIDE BLD-SCNC: 103 MMOL/L (ref 99–110)
CO2: 26 MMOL/L (ref 21–32)
CREAT SERPL-MCNC: 0.7 MG/DL (ref 0.8–1.3)
EKG ATRIAL RATE: 92 BPM
EKG DIAGNOSIS: NORMAL
EKG P AXIS: 75 DEGREES
EKG P-R INTERVAL: 144 MS
EKG Q-T INTERVAL: 346 MS
EKG QRS DURATION: 80 MS
EKG QTC CALCULATION (BAZETT): 427 MS
EKG R AXIS: 70 DEGREES
EKG T AXIS: 68 DEGREES
EKG VENTRICULAR RATE: 92 BPM
EOSINOPHILS ABSOLUTE: 0.2 K/UL (ref 0–0.6)
EOSINOPHILS RELATIVE PERCENT: 1.8 %
GFR AFRICAN AMERICAN: >60
GFR NON-AFRICAN AMERICAN: >60
GLUCOSE BLD-MCNC: 103 MG/DL (ref 70–99)
HCO3 VENOUS: 24.5 MMOL/L (ref 23–29)
HCT VFR BLD CALC: 41.2 % (ref 40.5–52.5)
HEMOGLOBIN: 13.7 G/DL (ref 13.5–17.5)
INFLUENZA A: NOT DETECTED
INFLUENZA B: NOT DETECTED
LYMPHOCYTES ABSOLUTE: 1.7 K/UL (ref 1–5.1)
LYMPHOCYTES RELATIVE PERCENT: 15.3 %
MCH RBC QN AUTO: 30 PG (ref 26–34)
MCHC RBC AUTO-ENTMCNC: 33.4 G/DL (ref 31–36)
MCV RBC AUTO: 89.7 FL (ref 80–100)
METHEMOGLOBIN VENOUS: 0.3 %
MONOCYTES ABSOLUTE: 1.4 K/UL (ref 0–1.3)
MONOCYTES RELATIVE PERCENT: 12.4 %
NEUTROPHILS ABSOLUTE: 7.9 K/UL (ref 1.7–7.7)
NEUTROPHILS RELATIVE PERCENT: 70.1 %
O2 SAT, VEN: 74 %
O2 THERAPY: ABNORMAL
PCO2, VEN: 42.4 MMHG (ref 40–50)
PDW BLD-RTO: 13.7 % (ref 12.4–15.4)
PH VENOUS: 7.38 (ref 7.35–7.45)
PLATELET # BLD: 297 K/UL (ref 135–450)
PMV BLD AUTO: 7.8 FL (ref 5–10.5)
PO2, VEN: 40 MMHG (ref 25–40)
POTASSIUM REFLEX MAGNESIUM: 4.1 MMOL/L (ref 3.5–5.1)
RBC # BLD: 4.59 M/UL (ref 4.2–5.9)
SARS-COV-2 RNA, RT PCR: NOT DETECTED
SODIUM BLD-SCNC: 139 MMOL/L (ref 136–145)
TCO2 CALC VENOUS: 26 MMOL/L
TOTAL PROTEIN: 7.8 G/DL (ref 6.4–8.2)
TROPONIN: <0.01 NG/ML
WBC # BLD: 11.3 K/UL (ref 4–11)

## 2022-07-13 PROCEDURE — 93005 ELECTROCARDIOGRAM TRACING: CPT | Performed by: STUDENT IN AN ORGANIZED HEALTH CARE EDUCATION/TRAINING PROGRAM

## 2022-07-13 PROCEDURE — 93010 ELECTROCARDIOGRAM REPORT: CPT | Performed by: INTERNAL MEDICINE

## 2022-07-13 PROCEDURE — 84484 ASSAY OF TROPONIN QUANT: CPT

## 2022-07-13 PROCEDURE — 99285 EMERGENCY DEPT VISIT HI MDM: CPT

## 2022-07-13 PROCEDURE — 76705 ECHO EXAM OF ABDOMEN: CPT

## 2022-07-13 PROCEDURE — 6370000000 HC RX 637 (ALT 250 FOR IP): Performed by: STUDENT IN AN ORGANIZED HEALTH CARE EDUCATION/TRAINING PROGRAM

## 2022-07-13 PROCEDURE — 71046 X-RAY EXAM CHEST 2 VIEWS: CPT

## 2022-07-13 PROCEDURE — 6360000002 HC RX W HCPCS: Performed by: STUDENT IN AN ORGANIZED HEALTH CARE EDUCATION/TRAINING PROGRAM

## 2022-07-13 PROCEDURE — 36415 COLL VENOUS BLD VENIPUNCTURE: CPT

## 2022-07-13 PROCEDURE — 80053 COMPREHEN METABOLIC PANEL: CPT

## 2022-07-13 PROCEDURE — 82803 BLOOD GASES ANY COMBINATION: CPT

## 2022-07-13 PROCEDURE — 87636 SARSCOV2 & INF A&B AMP PRB: CPT

## 2022-07-13 PROCEDURE — 85025 COMPLETE CBC W/AUTO DIFF WBC: CPT

## 2022-07-13 PROCEDURE — 96374 THER/PROPH/DIAG INJ IV PUSH: CPT

## 2022-07-13 RX ORDER — AZITHROMYCIN 250 MG/1
500 TABLET, FILM COATED ORAL ONCE
Status: COMPLETED | OUTPATIENT
Start: 2022-07-13 | End: 2022-07-13

## 2022-07-13 RX ORDER — ASPIRIN 81 MG/1
162 TABLET ORAL DAILY
COMMUNITY

## 2022-07-13 RX ORDER — AMOXICILLIN AND CLAVULANATE POTASSIUM 875; 125 MG/1; MG/1
1 TABLET, FILM COATED ORAL 2 TIMES DAILY
Qty: 14 TABLET | Refills: 0 | Status: SHIPPED | OUTPATIENT
Start: 2022-07-13 | End: 2022-07-20

## 2022-07-13 RX ORDER — AZITHROMYCIN 250 MG/1
250 TABLET, FILM COATED ORAL SEE ADMIN INSTRUCTIONS
Qty: 6 TABLET | Refills: 0 | Status: SHIPPED | OUTPATIENT
Start: 2022-07-13 | End: 2022-07-18

## 2022-07-13 RX ORDER — KETOROLAC TROMETHAMINE 30 MG/ML
15 INJECTION, SOLUTION INTRAMUSCULAR; INTRAVENOUS ONCE
Status: COMPLETED | OUTPATIENT
Start: 2022-07-13 | End: 2022-07-13

## 2022-07-13 RX ORDER — AMOXICILLIN AND CLAVULANATE POTASSIUM 875; 125 MG/1; MG/1
1 TABLET, FILM COATED ORAL ONCE
Status: COMPLETED | OUTPATIENT
Start: 2022-07-13 | End: 2022-07-13

## 2022-07-13 RX ADMIN — KETOROLAC TROMETHAMINE 15 MG: 30 INJECTION, SOLUTION INTRAMUSCULAR; INTRAVENOUS at 09:40

## 2022-07-13 RX ADMIN — AZITHROMYCIN 500 MG: 250 TABLET, FILM COATED ORAL at 12:53

## 2022-07-13 RX ADMIN — AMOXICILLIN AND CLAVULANATE POTASSIUM 1 TABLET: 875; 125 TABLET, FILM COATED ORAL at 12:53

## 2022-07-13 ASSESSMENT — PAIN - FUNCTIONAL ASSESSMENT: PAIN_FUNCTIONAL_ASSESSMENT: 0-10

## 2022-07-13 ASSESSMENT — PAIN DESCRIPTION - LOCATION: LOCATION: FLANK

## 2022-07-13 ASSESSMENT — PAIN SCALES - GENERAL: PAINLEVEL_OUTOF10: 10

## 2022-07-13 ASSESSMENT — PAIN DESCRIPTION - ORIENTATION: ORIENTATION: RIGHT

## 2022-07-13 NOTE — Clinical Note
Gomez Kelley was seen and treated in our emergency department on 7/13/2022. He may return to work on 07/14/2022. If you have any questions or concerns, please don't hesitate to call.       Collis Dakins, DO

## 2022-07-13 NOTE — ED NOTES
.Reviewed discharge instructions with patient. Patient verbalized understanding. No distress noted. Ambulatory from department.      Caden Collins RN  07/13/22 2611

## 2022-07-13 NOTE — ED PROVIDER NOTES
[address] ED      CHIEF COMPLAINT  Flank Pain (Pt states right sided flank pain since last night. Pt states that it gets hard to breathe when he moves around.) and Shortness of Breath       HISTORY OF PRESENT ILLNESS  Antonio Tran is a 58 y.o. male  who presents to the ED complaining of right upper quadrant abdominal pain that started last night. Patient notes that he was changing tires on the large truck yesterday when he got in the shower in the evening he started noticing a mild right upper quadrant pain that progressively worsened throughout the night. He states that the pain is mildly improved since arriving in the ED. Denies any shortness of breath out of the ordinary with his emphysema, but notes that the pain is worse with deep inspiration. Also endorses subjective fevers and chills. Denies any nausea, vomiting, chest pain. Had 1 loose bowel movement yesterday. Denies any leg pain or swelling. No other complaints, modifying factors or associated symptoms. I have reviewed the following from the nursing documentation. Past Medical History:   Diagnosis Date    Emphysema lung [facility])      Past Surgical History:   Procedure Laterality Date    CHOLECYSTECTOMY, LAPAROSCOPIC N/A 03/01/2021    CHOLECYSTECTOMY, LAPAROSCOPIC N/A 3/1/2021    LAPAROSCOPIC CHOLECYSTECTOMY performed by Cade Davis MD at [facility]      band placed around prostrate     History reviewed. No pertinent family history.   Social History     Socioeconomic History    Marital status:      Spouse name: Not on file    Number of children: Not on file    Years of education: Not on file    Highest education level: Not on file   Occupational History    Not on file   Tobacco Use    Smoking status: Current Some Day Smoker     Packs/day: 0.50     Types: Cigarettes    Smokeless tobacco: Never Used    Tobacco comment: trying to quit   Vaping Use    Vaping Use: Never used Substance and Sexual Activity    Alcohol use: Not Currently    Drug use: Never    Sexual activity: Not on file   Other Topics Concern    Not on file   Social History Narrative    Not on file     Social Determinants of Health     Financial Resource Strain:     Difficulty of Paying Living Expenses: Not on file   Food Insecurity:     Worried About Running Out of Food in the Last Year: Not on file    Nathan of Food in the Last Year: Not on file   Transportation Needs:     Lack of Transportation (Medical): Not on file    Lack of Transportation (Non-Medical): Not on file   Physical Activity:     Days of Exercise per Week: Not on file    Minutes of Exercise per Session: Not on file   Stress:     Feeling of Stress : Not on file   Social Connections:     Frequency of Communication with Friends and Family: Not on file    Frequency of Social Gatherings with Friends and Family: Not on file    Attends Druze Services: Not on file    Active Member of [address] [place] [address] or Organizations: Not on file    Attends Club or Organization Meetings: Not on file    Marital Status: Not on file   Intimate Partner Violence:     Fear of Current or Ex-Partner: Not on file    Emotionally Abused: Not on file    Physically Abused: Not on file    Sexually Abused: Not on file   Housing Stability:     Unable to Pay for Housing in the Last Year: Not on file    Number of [place] in the Last Year: Not on file    Unstable Housing in the Last Year: Not on file     No current facility-administered medications for this encounter.      Current Outpatient Medications   Medication Sig Dispense Refill    aspirin 81 MG EC tablet Take 162 mg by mouth daily       albuterol sulfate HFA (VENTOLIN HFA) 108 (90 Base) MCG/ACT inhaler Inhale 2 puffs into the lungs 4 times daily as needed for Wheezing 1 Inhaler 0    loratadine (CLARITIN) 10 MG tablet Take 10 mg by mouth daily       Allergies   Allergen Reactions    Codeine Nausea Only REVIEW OF SYSTEMS  10 systems reviewed, pertinent positives per HPI otherwise noted to be negative. PHYSICAL EXAM  /75   Pulse 97   Temp 98.3 °F (36.8 °C) (Oral)   Resp 20   Ht 5' 8\" (1.727 m)   Wt 163 lb (73.9 kg)   SpO2 94%   BMI 24.78 kg/m²    General: Appears well. Alert  HEENT: Head atraumatic, Eyes normal inspection, PERRL. Normal ENT inspection, Pharynx normal. No signs of dehydration  NECK: Normal inspection  RESPIRATORY: Normal breath sounds. No chest wall tenderness. No respiratory distress  CVS: Heart rate and rhythm regular. No Murmurs  ABDOMEN/GI: Mild right upper quadrant tenderness, positive Orellana sign, No distention  BACK: Normal inspection  EXTREMITIES: Non-Tender. Full ROM. Normal appearance. No Pedal edema  NEURO: Alert and oriented. Sensation normal. Motor normal  PSYCH: Mood normal. Affect normal.  SKIN: Color normal. No rash. Warm, Dry    LABS  I have reviewed all labs for this visit.    Results for orders placed or performed during the hospital encounter of 07/13/22   COVID-19 & Influenza Combo    Specimen: Nasopharyngeal Swab   Result Value Ref Range    SARS-CoV-2 RNA, RT PCR NOT DETECTED NOT DETECTED    INFLUENZA A NOT DETECTED NOT DETECTED    INFLUENZA B NOT DETECTED NOT DETECTED   CBC with Auto Differential   Result Value Ref Range    WBC 11.3 (H) 4.0 - 11.0 K/uL    RBC 4.59 4.20 - 5.90 M/uL    Hemoglobin 13.7 13.5 - 17.5 g/dL    Hematocrit 41.2 40.5 - 52.5 %    MCV 89.7 80.0 - 100.0 fL    MCH 30.0 26.0 - 34.0 pg    MCHC 33.4 31.0 - 36.0 g/dL    RDW 13.7 12.4 - 15.4 %    Platelets 835 539 - 301 K/uL    MPV 7.8 5.0 - 10.5 fL    Neutrophils % 70.1 %    Lymphocytes % 15.3 %    Monocytes % 12.4 %    Eosinophils % 1.8 %    Basophils % 0.4 %    Neutrophils Absolute 7.9 (H) 1.7 - 7.7 K/uL    Lymphocytes Absolute 1.7 1.0 - 5.1 K/uL    Monocytes Absolute 1.4 (H) 0.0 - 1.3 K/uL    Eosinophils Absolute 0.2 0.0 - 0.6 K/uL    Basophils Absolute 0.0 0.0 - 0.2 K/uL   Comprehensive Metabolic Panel w/ Reflex to MG   Result Value Ref Range    Sodium 139 136 - 145 mmol/L    Potassium reflex Magnesium 4.1 3.5 - 5.1 mmol/L    Chloride 103 99 - 110 mmol/L    CO2 26 21 - 32 mmol/L    Anion Gap 10 3 - 16    Glucose 103 (H) 70 - 99 mg/dL    BUN 14 7 - 20 mg/dL    CREATININE 0.7 (L) 0.8 - 1.3 mg/dL    GFR Non-African American >60 >60    GFR African American >60 >60    Calcium 9.7 8.3 - 10.6 mg/dL    Total Protein 7.8 6.4 - 8.2 g/dL    Albumin 4.6 3.4 - 5.0 g/dL    Albumin/Globulin Ratio 1.4 1.1 - 2.2    Total Bilirubin 0.4 0.0 - 1.0 mg/dL    Alkaline Phosphatase 162 (H) 40 - 129 U/L    ALT 17 10 - 40 U/L    AST 19 15 - 37 U/L   Troponin   Result Value Ref Range    Troponin <0.01 <0.01 ng/mL   Blood Gas, Venous   Result Value Ref Range    pH, Bradley 7.379 7.350 - 7.450    pCO2, Bradley 42.4 40.0 - 50.0 mmHg    pO2, Bradley 40.0 25.0 - 40.0 mmHg    HCO3, Venous 24.5 23.0 - 29.0 mmol/L    Base Excess, Bradley -0.7 -3.0 - 3.0 mmol/L    O2 Sat, Bradley 74 Not Established %    Carboxyhemoglobin 5.8 (H) 0.0 - 1.5 %    MetHgb, Bradley 0.3 <1.5 %    TC02 (Calc), Bradley 26 Not Established mmol/L    O2 Therapy Unknown    EKG 12 Lead   Result Value Ref Range    Ventricular Rate 92 BPM    Atrial Rate 92 BPM    P-R Interval 144 ms    QRS Duration 80 ms    Q-T Interval 346 ms    QTc Calculation (Bazett) 427 ms    P Axis 75 degrees    R Axis 70 degrees    T Axis 68 degrees    Diagnosis       Normal sinus rhythmNormal ECGWhen compared with ECG of 24-JUL-2021 17:59,No significant change was foundConfirmed by CHANDNI PRATT MD ([id number]) on 7/13/2022 11:10:58 AM       ECG  The Ekg interpreted by me shows  Sinus rhythm with a rate of 92 bpm.  Normal axis. No acute injury pattern. , QRS 80, QTc 427. No significant change from prior EKG dated 7/24/2021. RADIOLOGY  US GALLBLADDER RUQ   Final Result   Unremarkable right upper quadrant ultrasound status post cholecystectomy. No   significant intrahepatic or extrahepatic biliary dilatation. XR CHEST (2 VW)   Final Result   Mostly chronic changes in the lungs, with mild superimposed infiltrate in the   right lower lung. ED COURSE/MDM  Patient seen and evaluated. Old records reviewed. Labs and imaging reviewed and results discussed with patient. Cardiac work-up obtained. Chest x-ray is concerning for possible right lower lobe pneumonia. Treated with Augmentin and azithromycin. On exam patient does have significant right upper quadrant tenderness with a positive Orellana sign. He has had a cholecystectomy however has been within the last 2 years and had concern for the possibility of a retained stone. Will obtain right upper quadrant ultrasound. Right upper quadrant ultrasound shows no evidence of biliary dilatation. LFTs are normal, bilirubin not elevated. Patient treated with Toradol with improvement in his pain. He has not required any oxygen and is currently feeling comfortable with outpatient treatment with oral antibiotics and follow-up to PCP. Given return precautions for severe worsening pain, shortness of breath, fevers after 2 days of antibiotics. Is this patient to be included in the SEP-1 Core Measure due to severe sepsis or septic shock? No   Exclusion criteria - the patient is NOT to be included for SEP-1 Core Measure due to:  2+ SIRS criteria are not met    During the patient's ED course, the patient was given:  Medications   ketorolac (TORADOL) injection 15 mg (15 mg IntraVENous Given 7/13/22 0940)   azithromycin (ZITHROMAX) tablet 500 mg (500 mg Oral Given 7/13/22 1253)   amoxicillin-clavulanate (AUGMENTIN) 875-125 MG per tablet 1 tablet (1 tablet Oral Given 7/13/22 1253)        CLINICAL IMPRESSION  1. Pneumonia of right lower lobe due to infectious organism        Blood pressure 108/76, pulse 77, temperature 98.3 °F (36.8 °C), temperature source Oral, resp. rate 17, height 5' 8\" (1.727 m), weight 163 lb (73.9 kg), SpO2 96 %.     [address] [place] was discharged to home in stable condition. Patient was given scripts for the following medications. I counseled patient how to take these medications. Discharge Medication List as of 7/13/2022 12:50 PM      START taking these medications    Details   amoxicillin-clavulanate (AUGMENTIN) 875-125 MG per tablet Take 1 tablet by mouth 2 times daily for 7 days, Disp-14 tablet, R-0Normal      azithromycin (ZITHROMAX) 250 MG tablet Take 1 tablet by mouth See Admin Instructions for 5 days 500mg on day 1 followed by 250mg on days 2 - 5, Disp-6 tablet, R-0Normal           Follow-up with:  MD Chepe Grimm [id number]  [phone]    Call in 2 days  As needed    DISCLAIMER: This chart was created using Dragon dictation software. Efforts were made by me to ensure accuracy, however some errors may be present due to limitations of this technology and occasionally words are not transcribed correctly.      Derrick Wilson,   07/13/22 8929

## 2024-03-16 ENCOUNTER — APPOINTMENT (OUTPATIENT)
Dept: GENERAL RADIOLOGY | Age: 64
End: 2024-03-16
Payer: COMMERCIAL

## 2024-03-16 ENCOUNTER — HOSPITAL ENCOUNTER (EMERGENCY)
Age: 64
Discharge: HOME | End: 2024-03-16
Attending: EMERGENCY MEDICINE
Payer: COMMERCIAL

## 2024-03-16 VITALS
HEART RATE: 94 BPM | HEIGHT: 68 IN | SYSTOLIC BLOOD PRESSURE: 127 MMHG | BODY MASS INDEX: 22.73 KG/M2 | OXYGEN SATURATION: 91 % | WEIGHT: 150 LBS | RESPIRATION RATE: 32 BRPM | DIASTOLIC BLOOD PRESSURE: 75 MMHG | TEMPERATURE: 100.5 F

## 2024-03-16 DIAGNOSIS — J18.9 PNEUMONIA OF RIGHT LOWER LOBE DUE TO INFECTIOUS ORGANISM: ICD-10-CM

## 2024-03-16 DIAGNOSIS — R07.9 CHEST PAIN, UNSPECIFIED TYPE: Primary | ICD-10-CM

## 2024-03-16 LAB
ALBUMIN SERPL-MCNC: 4 G/DL (ref 3.4–5)
ALBUMIN/GLOB SERPL: 1.3 {RATIO} (ref 1.1–2.2)
ALP SERPL-CCNC: 214 U/L (ref 40–129)
ALT SERPL-CCNC: 31 U/L (ref 10–40)
ANION GAP SERPL CALCULATED.3IONS-SCNC: 13 MMOL/L (ref 3–16)
AST SERPL-CCNC: 30 U/L (ref 15–37)
BASE EXCESS BLDV CALC-SCNC: 0.3 MMOL/L (ref -3–3)
BASOPHILS # BLD: 0.1 K/UL (ref 0–0.2)
BASOPHILS NFR BLD: 0.7 %
BILIRUB SERPL-MCNC: 0.8 MG/DL (ref 0–1)
BUN SERPL-MCNC: 8 MG/DL (ref 7–20)
CALCIUM SERPL-MCNC: 9.1 MG/DL (ref 8.3–10.6)
CHLORIDE SERPL-SCNC: 97 MMOL/L (ref 99–110)
CO2 BLDV-SCNC: 26 MMOL/L
CO2 SERPL-SCNC: 25 MMOL/L (ref 21–32)
COHGB MFR BLDV: 5.1 % (ref 0–1.5)
CREAT SERPL-MCNC: 0.7 MG/DL (ref 0.8–1.3)
DEPRECATED RDW RBC AUTO: 13.6 % (ref 12.4–15.4)
EKG ATRIAL RATE: 94 BPM
EKG DIAGNOSIS: NORMAL
EKG P AXIS: 86 DEGREES
EKG P-R INTERVAL: 134 MS
EKG Q-T INTERVAL: 332 MS
EKG QRS DURATION: 74 MS
EKG QTC CALCULATION (BAZETT): 415 MS
EKG R AXIS: 76 DEGREES
EKG T AXIS: 75 DEGREES
EKG VENTRICULAR RATE: 94 BPM
EOSINOPHIL # BLD: 0.1 K/UL (ref 0–0.6)
EOSINOPHIL NFR BLD: 0.4 %
FLUAV RNA RESP QL NAA+PROBE: NOT DETECTED
FLUBV RNA RESP QL NAA+PROBE: NOT DETECTED
GFR SERPLBLD CREATININE-BSD FMLA CKD-EPI: >60 ML/MIN/{1.73_M2}
GLUCOSE SERPL-MCNC: 126 MG/DL (ref 70–99)
HCO3 BLDV-SCNC: 24.7 MMOL/L (ref 23–29)
HCT VFR BLD AUTO: 41.2 % (ref 40.5–52.5)
HGB BLD-MCNC: 13.8 G/DL (ref 13.5–17.5)
LACTATE BLDV-SCNC: 1.7 MMOL/L (ref 0.4–1.9)
LIPASE SERPL-CCNC: 24 U/L (ref 13–60)
LYMPHOCYTES # BLD: 1.5 K/UL (ref 1–5.1)
LYMPHOCYTES NFR BLD: 9.2 %
MCH RBC QN AUTO: 30.1 PG (ref 26–34)
MCHC RBC AUTO-ENTMCNC: 33.4 G/DL (ref 31–36)
MCV RBC AUTO: 90.2 FL (ref 80–100)
METHGB MFR BLDV: 0.3 %
MONOCYTES # BLD: 1.2 K/UL (ref 0–1.3)
MONOCYTES NFR BLD: 7.3 %
NEUTROPHILS # BLD: 13.2 K/UL (ref 1.7–7.7)
NEUTROPHILS NFR BLD: 82.4 %
NT-PROBNP SERPL-MCNC: 279 PG/ML (ref 0–124)
O2 CT VFR BLDV CALC: 14 VOL %
O2 THERAPY: ABNORMAL
PCO2 BLDV: 39.3 MMHG (ref 40–50)
PH BLDV: 7.42 [PH] (ref 7.35–7.45)
PLATELET # BLD AUTO: 444 K/UL (ref 135–450)
PMV BLD AUTO: 7.6 FL (ref 5–10.5)
PO2 BLDV: 30.9 MMHG (ref 25–40)
POTASSIUM SERPL-SCNC: 3.7 MMOL/L (ref 3.5–5.1)
PROT SERPL-MCNC: 7.2 G/DL (ref 6.4–8.2)
RBC # BLD AUTO: 4.57 M/UL (ref 4.2–5.9)
SAO2 % BLDV: 60 %
SARS-COV-2 RNA RESP QL NAA+PROBE: NOT DETECTED
SODIUM SERPL-SCNC: 135 MMOL/L (ref 136–145)
TROPONIN, HIGH SENSITIVITY: 13 NG/L (ref 0–22)
TROPONIN, HIGH SENSITIVITY: 15 NG/L (ref 0–22)
WBC # BLD AUTO: 16 K/UL (ref 4–11)

## 2024-03-16 PROCEDURE — 87040 BLOOD CULTURE FOR BACTERIA: CPT

## 2024-03-16 PROCEDURE — 83880 ASSAY OF NATRIURETIC PEPTIDE: CPT

## 2024-03-16 PROCEDURE — 36415 COLL VENOUS BLD VENIPUNCTURE: CPT

## 2024-03-16 PROCEDURE — 93010 ELECTROCARDIOGRAM REPORT: CPT | Performed by: INTERNAL MEDICINE

## 2024-03-16 PROCEDURE — 84484 ASSAY OF TROPONIN QUANT: CPT

## 2024-03-16 PROCEDURE — 99285 EMERGENCY DEPT VISIT HI MDM: CPT

## 2024-03-16 PROCEDURE — 83690 ASSAY OF LIPASE: CPT

## 2024-03-16 PROCEDURE — 87636 SARSCOV2 & INF A&B AMP PRB: CPT

## 2024-03-16 PROCEDURE — 85025 COMPLETE CBC W/AUTO DIFF WBC: CPT

## 2024-03-16 PROCEDURE — 82803 BLOOD GASES ANY COMBINATION: CPT

## 2024-03-16 PROCEDURE — 83605 ASSAY OF LACTIC ACID: CPT

## 2024-03-16 PROCEDURE — 6370000000 HC RX 637 (ALT 250 FOR IP): Performed by: EMERGENCY MEDICINE

## 2024-03-16 PROCEDURE — 80053 COMPREHEN METABOLIC PANEL: CPT

## 2024-03-16 PROCEDURE — 71046 X-RAY EXAM CHEST 2 VIEWS: CPT

## 2024-03-16 PROCEDURE — 93005 ELECTROCARDIOGRAM TRACING: CPT | Performed by: EMERGENCY MEDICINE

## 2024-03-16 RX ORDER — LIDOCAINE 4 G/G
1 PATCH TOPICAL ONCE
Status: DISCONTINUED | OUTPATIENT
Start: 2024-03-16 | End: 2024-03-16 | Stop reason: HOSPADM

## 2024-03-16 RX ORDER — LEVOFLOXACIN 5 MG/ML
750 INJECTION, SOLUTION INTRAVENOUS ONCE
Status: DISCONTINUED | OUTPATIENT
Start: 2024-03-16 | End: 2024-03-16

## 2024-03-16 RX ORDER — ASPIRIN 325 MG
325 TABLET ORAL ONCE
Status: COMPLETED | OUTPATIENT
Start: 2024-03-16 | End: 2024-03-16

## 2024-03-16 RX ORDER — PREDNISONE 10 MG/1
60 TABLET ORAL DAILY
Qty: 30 TABLET | Refills: 0 | Status: SHIPPED | OUTPATIENT
Start: 2024-03-16 | End: 2024-03-21

## 2024-03-16 RX ORDER — LEVOFLOXACIN 750 MG/1
750 TABLET, FILM COATED ORAL ONCE
Status: COMPLETED | OUTPATIENT
Start: 2024-03-16 | End: 2024-03-16

## 2024-03-16 RX ORDER — IPRATROPIUM BROMIDE AND ALBUTEROL SULFATE 2.5; .5 MG/3ML; MG/3ML
2 SOLUTION RESPIRATORY (INHALATION) ONCE
Status: COMPLETED | OUTPATIENT
Start: 2024-03-16 | End: 2024-03-16

## 2024-03-16 RX ORDER — PREDNISONE 20 MG/1
60 TABLET ORAL ONCE
Status: COMPLETED | OUTPATIENT
Start: 2024-03-16 | End: 2024-03-16

## 2024-03-16 RX ORDER — ACETAMINOPHEN 325 MG/1
650 TABLET ORAL ONCE
Status: COMPLETED | OUTPATIENT
Start: 2024-03-16 | End: 2024-03-16

## 2024-03-16 RX ORDER — LEVOFLOXACIN 750 MG/1
750 TABLET, FILM COATED ORAL DAILY
Qty: 10 TABLET | Refills: 0 | Status: SHIPPED | OUTPATIENT
Start: 2024-03-16 | End: 2024-03-26

## 2024-03-16 RX ORDER — ACETAMINOPHEN 500 MG
500 TABLET ORAL 4 TIMES DAILY PRN
Qty: 120 TABLET | Refills: 0 | Status: SHIPPED | OUTPATIENT
Start: 2024-03-16

## 2024-03-16 RX ORDER — ALBUTEROL SULFATE 90 UG/1
2 AEROSOL, METERED RESPIRATORY (INHALATION) 4 TIMES DAILY PRN
Qty: 18 G | Refills: 0 | Status: SHIPPED | OUTPATIENT
Start: 2024-03-16

## 2024-03-16 RX ORDER — LIDOCAINE 4 G/G
1 PATCH TOPICAL DAILY
Qty: 30 PATCH | Refills: 0 | Status: SHIPPED | OUTPATIENT
Start: 2024-03-16 | End: 2024-04-15

## 2024-03-16 RX ADMIN — PREDNISONE 60 MG: 20 TABLET ORAL at 06:09

## 2024-03-16 RX ADMIN — ASPIRIN 325 MG: 325 TABLET ORAL at 06:09

## 2024-03-16 RX ADMIN — LEVOFLOXACIN 750 MG: 750 TABLET, FILM COATED ORAL at 06:55

## 2024-03-16 RX ADMIN — ACETAMINOPHEN 650 MG: 325 TABLET ORAL at 06:09

## 2024-03-16 RX ADMIN — IPRATROPIUM BROMIDE AND ALBUTEROL SULFATE 2 DOSE: .5; 2.5 SOLUTION RESPIRATORY (INHALATION) at 06:09

## 2024-03-16 ASSESSMENT — PAIN - FUNCTIONAL ASSESSMENT
PAIN_FUNCTIONAL_ASSESSMENT: ACTIVITIES ARE NOT PREVENTED
PAIN_FUNCTIONAL_ASSESSMENT: 0-10

## 2024-03-16 ASSESSMENT — PAIN DESCRIPTION - LOCATION: LOCATION: CHEST

## 2024-03-16 ASSESSMENT — LIFESTYLE VARIABLES
HOW OFTEN DO YOU HAVE A DRINK CONTAINING ALCOHOL: NEVER
HOW MANY STANDARD DRINKS CONTAINING ALCOHOL DO YOU HAVE ON A TYPICAL DAY: PATIENT DOES NOT DRINK

## 2024-03-16 ASSESSMENT — PAIN SCALES - GENERAL: PAINLEVEL_OUTOF10: 10

## 2024-03-16 ASSESSMENT — PAIN DESCRIPTION - ORIENTATION: ORIENTATION: RIGHT

## 2024-03-16 NOTE — ED PROVIDER NOTES
[facility] ED  EMERGENCY DEPARTMENT ENCOUNTER        Pt Name: Homer Guaman  MRN: [id number]  Birthdate 1960  Date of evaluation: 3/16/2024  Provider: Terrence Infante MD  PCP: Rachid Sarabia MD  Note Started: 6:03 AM EDT 3/16/24    CHIEF COMPLAINT       Chief Complaint   Patient presents with    Chest Pain     Right sided chest pain started 11pm.     Shortness of Breath     SOB x 1 week.       HISTORY OF PRESENT ILLNESS: 1 or more Elements   History From: Patient    {Limitations to history (Optional):86764}    Homer Guaman is a 64 y.o. male who presents for evaluation of right-sided lower anterior chest wall pain shortness of breath and diaphoresis.  Patient reports 1 week history of progressive worsening shortness of breath.  Reports a history of COPD but does not taking medications for COPD treatment.  He reports pain in the right chest was gotten worse since the previous evening.  His pain is worse with deep breaths and movements.  He does report having a productive cough.  Denies lower extremity swelling or pain.     Nursing Notes were all reviewed and agreed with or any disagreements were addressed in the HPI.    REVIEW OF SYSTEMS :      Review of Systems    Positives and Pertinent negatives as per HPI.     SURGICAL HISTORY     Past Surgical History:   Procedure Laterality Date    CHOLECYSTECTOMY, LAPAROSCOPIC N/A 03/01/2021    CHOLECYSTECTOMY, LAPAROSCOPIC N/A 3/1/2021    [place]OS[place]IC [place]STECT[place] [facility]    PROSTRA ULTRASOUND GUIDANCE      band placed around prostrate       CURRENTMEDICATIONS       Previous Medications    ALBUTEROL SULFATE HFA (VENTOLIN HFA) 108 (90 BASE) MCG/ACT INHALER    Inhale 2 puffs into the lungs 4 times daily as needed for Wheezing    ASPIRIN 81 MG EC TABLET    Take 2 tablets by mouth daily    LORATADINE (CLARITIN) 10 MG TABLET    Take 1 tablet by mouth daily       ALLERGIES     Codeine    FAMILYHISTORY    administration in time range)   lidocaine 4 % external patch 1 patch (has no administration in time range)   ipratropium 0.5 mg-albuterol 2.5 mg (DUONEB) nebulizer solution 2 Dose (has no administration in time range)   predniSONE (DELTASONE) tablet 60 mg (has no administration in time range)             Is this patient to be included in the SEP-1 Core Measure due to severe sepsis or septic shock?   No   Exclusion criteria - the patient is NOT to be included for SEP-1 Core Measure due to:  May have criteria for sepsis, but does not meet criteria for severe sepsis or septic shock    CC/HPI Summary, DDx, ED Course, and Reassessment: Differential Diagnosis: Acute Coronary Syndrome, Congestive Heart Failure, Myocardial Infarction, Pulmonary Embolus, Pneumonia, Pneumothorax, other    64-year-old male with a history of COPD presents ED for evaluation of shortness of breath and right-sided chest pain.  He is notably febrile and t tachypneic on arrival.  He has a diffuse wheezing with prolonged expiratory phase.  He is febrile on arrival.       Laboratory workup does show leukocytosis but lactate is within normal limits.  There are no additional emergent laboratory abnormalities.  Chest x-ray today is concerning for pneumonia.  Patient provided with DuoNeb treatments prednisone as well as Levaquin.  On reevaluation does report improvement of symptoms.  Patient has similar presentation a few years previously.  Admission to the hospital discussed with patient would prefer outpatient treatment.  Return precaution discussed with patient.  I have low suspicion for cardiac etiology the patient's symptoms and suspect that his symptoms are secondary to pneumonia.     CONSULTS: (Who and What was discussed)  None          Chronic Conditions:   Past Medical History:   Diagnosis Date    Emphysema lung (HCC)          Records Reviewed (External and source): Reviewed patient's previous stress test and ED visit for pneumonia.

## 2024-03-17 LAB — BACTERIA BLD CULT: NORMAL

## 2024-03-20 LAB — BACTERIA BLD CULT: NORMAL

## (undated) DEVICE — TROCAR ENDOSCP L100MM DIA5MM BLDELSS STBL SL OBT RADLUC

## (undated) DEVICE — PUMP SUC IRR TBNG L10FT W/ HNDPC ASSEMB STRYKEFLOW 2

## (undated) DEVICE — CO2 INSUFFLATION NEEDLE: Brand: CORE DYNAMICS

## (undated) DEVICE — TROCAR ENDOSCP L100MM DIA5MM BLDELSS STBL SL THRD OPT VW

## (undated) DEVICE — APPLIER CLP M L L11.4IN DIA10MM ENDOSCP ROT MULT FOR LIG

## (undated) DEVICE — ELECTRODE PT RET AD L9FT HI MOIST COND ADH HYDRGEL CORDED

## (undated) DEVICE — DRAPE,ABDOMINAL,MAJOR,STERILE: Brand: MEDLINE

## (undated) DEVICE — APPLICATOR PREP 26ML 0.7% IOD POVACRYLEX 74% ISO ALC ST

## (undated) DEVICE — TUBING, SUCTION, 3/16" X 10', STRAIGHT: Brand: MEDLINE

## (undated) DEVICE — GAUZE SPONGES,8 PLY: Brand: CURITY

## (undated) DEVICE — KIT,ANTI FOG,W/SPONGE & FLUID,SOFT PACK: Brand: MEDLINE

## (undated) DEVICE — 3M™ STERI-STRIP™ COMPOUND BENZOIN TINCTURE 40 BAGS/CARTON 4 CARTONS/CASE C1544: Brand: 3M™ STERI-STRIP™

## (undated) DEVICE — SUTURE SZ 0 27IN 5/8 CIR UR-6  TAPER PT VIOLET ABSRB VICRYL J603H

## (undated) DEVICE — MAJOR SET UP PK

## (undated) DEVICE — TUBING INSUF ISO CONN DISP

## (undated) DEVICE — SUTURE VCRL SZ 4-0 L18IN ABSRB UD L19MM PS-2 3/8 CIR PRIM J496H

## (undated) DEVICE — CORD ES L10FT MPLR LAP

## (undated) DEVICE — TROCAR ENDOSCP L100MM DIA11MM STBL SL BLDELSS DISP ENDOPATH

## (undated) DEVICE — GLOVE ORANGE PI 8 1/2   MSG9085

## (undated) DEVICE — CIRCUIT ANES L72IN 3L BACT AND VIR FLTR EL CONN SGL LIMB

## (undated) DEVICE — GOWN,AURORA,NONREINF,RAGLAN,XXL,STERILE: Brand: MEDLINE

## (undated) DEVICE — YANKAUER,BULB TIP,W/O VENT,RIGID,STERILE: Brand: MEDLINE

## (undated) DEVICE — NEEDLE INSUF L120MM DIA2MM DISP FOR PNEUMOPERI ENDOPATH

## (undated) DEVICE — SOLUTION IV IRRIG 500ML 0.9% SODIUM CHL 2F7123

## (undated) DEVICE — TISSUE RETRIEVAL SYSTEM: Brand: INZII RETRIEVAL SYSTEM

## (undated) DEVICE — 3M™ TEGADERM™ TRANSPARENT FILM DRESSING FRAME STYLE, 1624W, 2-3/8 IN X 2-3/4 IN (6 CM X 7 CM), 100/CT 4CT/CASE: Brand: 3M™ TEGADERM™

## (undated) DEVICE — STANDARD HYPODERMIC NEEDLE,POLYPROPYLENE HUB: Brand: MONOJECT

## (undated) DEVICE — INTENDED FOR TISSUE SEPARATION, AND OTHER PROCEDURES THAT REQUIRE A SHARP SURGICAL BLADE TO PUNCTURE OR CUT.: Brand: BARD-PARKER ® STAINLESS STEEL BLADES

## (undated) DEVICE — SYRINGE MED 10ML LUERLOCK TIP W/O SFTY DISP